# Patient Record
Sex: FEMALE | Race: OTHER | HISPANIC OR LATINO | Employment: UNEMPLOYED | URBAN - METROPOLITAN AREA
[De-identification: names, ages, dates, MRNs, and addresses within clinical notes are randomized per-mention and may not be internally consistent; named-entity substitution may affect disease eponyms.]

---

## 2018-12-02 ENCOUNTER — APPOINTMENT (OUTPATIENT)
Dept: RADIOLOGY | Facility: MEDICAL CENTER | Age: 28
End: 2018-12-02
Attending: ANESTHESIOLOGY
Payer: COMMERCIAL

## 2018-12-02 ENCOUNTER — APPOINTMENT (OUTPATIENT)
Dept: RADIOLOGY | Facility: MEDICAL CENTER | Age: 28
End: 2018-12-02
Attending: FAMILY MEDICINE
Payer: COMMERCIAL

## 2018-12-02 ENCOUNTER — HOSPITAL ENCOUNTER (INPATIENT)
Facility: MEDICAL CENTER | Age: 28
LOS: 3 days | End: 2018-12-05
Admitting: OBSTETRICS & GYNECOLOGY
Payer: COMMERCIAL

## 2018-12-02 DIAGNOSIS — Z98.891 S/P REPEAT LOW TRANSVERSE C-SECTION: ICD-10-CM

## 2018-12-02 LAB
ALBUMIN SERPL BCP-MCNC: 3 G/DL (ref 3.2–4.9)
ALBUMIN/GLOB SERPL: 1 G/DL
ALP SERPL-CCNC: 157 U/L (ref 30–99)
ALT SERPL-CCNC: 10 U/L (ref 2–50)
ANION GAP SERPL CALC-SCNC: 11 MMOL/L (ref 0–11.9)
ANION GAP SERPL CALC-SCNC: 11 MMOL/L (ref 0–11.9)
APTT PPP: 26.9 SEC (ref 24.7–36)
APTT PPP: 27.4 SEC (ref 24.7–36)
AST SERPL-CCNC: 13 U/L (ref 12–45)
BASE EXCESS BLDA CALC-SCNC: -4 MMOL/L (ref -4–3)
BASOPHILS # BLD AUTO: 0.3 % (ref 0–1.8)
BASOPHILS # BLD AUTO: 0.3 % (ref 0–1.8)
BASOPHILS # BLD: 0.03 K/UL (ref 0–0.12)
BASOPHILS # BLD: 0.04 K/UL (ref 0–0.12)
BILIRUB SERPL-MCNC: 0.6 MG/DL (ref 0.1–1.5)
BODY TEMPERATURE: 36.5 CENTIGRADE
BUN SERPL-MCNC: 3 MG/DL (ref 8–22)
BUN SERPL-MCNC: 3 MG/DL (ref 8–22)
CALCIUM SERPL-MCNC: 7.7 MG/DL (ref 8.5–10.5)
CALCIUM SERPL-MCNC: 7.8 MG/DL (ref 8.5–10.5)
CHLORIDE SERPL-SCNC: 107 MMOL/L (ref 96–112)
CHLORIDE SERPL-SCNC: 108 MMOL/L (ref 96–112)
CO2 SERPL-SCNC: 18 MMOL/L (ref 20–33)
CO2 SERPL-SCNC: 19 MMOL/L (ref 20–33)
CREAT SERPL-MCNC: 0.32 MG/DL (ref 0.5–1.4)
CREAT SERPL-MCNC: 0.41 MG/DL (ref 0.5–1.4)
CRYSTALS AMN MICRO: NORMAL
EKG IMPRESSION: NORMAL
EOSINOPHIL # BLD AUTO: 0.04 K/UL (ref 0–0.51)
EOSINOPHIL # BLD AUTO: 0.06 K/UL (ref 0–0.51)
EOSINOPHIL NFR BLD: 0.3 % (ref 0–6.9)
EOSINOPHIL NFR BLD: 0.6 % (ref 0–6.9)
ERYTHROCYTE [DISTWIDTH] IN BLOOD BY AUTOMATED COUNT: 42.7 FL (ref 35.9–50)
ERYTHROCYTE [DISTWIDTH] IN BLOOD BY AUTOMATED COUNT: 43.5 FL (ref 35.9–50)
GLOBULIN SER CALC-MCNC: 2.9 G/DL (ref 1.9–3.5)
GLUCOSE SERPL-MCNC: 100 MG/DL (ref 65–99)
GLUCOSE SERPL-MCNC: 101 MG/DL (ref 65–99)
HCO3 BLDA-SCNC: 19 MMOL/L (ref 17–25)
HCT VFR BLD AUTO: 35.9 % (ref 37–47)
HCT VFR BLD AUTO: 37.2 % (ref 37–47)
HCT VFR BLD AUTO: 38.6 % (ref 37–47)
HGB BLD-MCNC: 11.6 G/DL (ref 12–16)
HGB BLD-MCNC: 12.4 G/DL (ref 12–16)
HGB BLD-MCNC: 13.2 G/DL (ref 12–16)
HOLDING TUBE BB 8507: NORMAL
IMM GRANULOCYTES # BLD AUTO: 0.17 K/UL (ref 0–0.11)
IMM GRANULOCYTES # BLD AUTO: 0.23 K/UL (ref 0–0.11)
IMM GRANULOCYTES NFR BLD AUTO: 1.6 % (ref 0–0.9)
IMM GRANULOCYTES NFR BLD AUTO: 1.7 % (ref 0–0.9)
INR PPP: 1.02 (ref 0.87–1.13)
INR PPP: 1.05 (ref 0.87–1.13)
LYMPHOCYTES # BLD AUTO: 1.95 K/UL (ref 1–4.8)
LYMPHOCYTES # BLD AUTO: 2.05 K/UL (ref 1–4.8)
LYMPHOCYTES NFR BLD: 13.7 % (ref 22–41)
LYMPHOCYTES NFR BLD: 20.7 % (ref 22–41)
MAGNESIUM SERPL-MCNC: 1.7 MG/DL (ref 1.5–2.5)
MCH RBC QN AUTO: 30.5 PG (ref 27–33)
MCH RBC QN AUTO: 30.7 PG (ref 27–33)
MCHC RBC AUTO-ENTMCNC: 33.3 G/DL (ref 33.6–35)
MCHC RBC AUTO-ENTMCNC: 34.2 G/DL (ref 33.6–35)
MCV RBC AUTO: 89.8 FL (ref 81.4–97.8)
MCV RBC AUTO: 91.6 FL (ref 81.4–97.8)
MONOCYTES # BLD AUTO: 0.65 K/UL (ref 0–0.85)
MONOCYTES # BLD AUTO: 0.74 K/UL (ref 0–0.85)
MONOCYTES NFR BLD AUTO: 5.2 % (ref 0–13.4)
MONOCYTES NFR BLD AUTO: 6.6 % (ref 0–13.4)
NEUTROPHILS # BLD AUTO: 11.27 K/UL (ref 2–7.15)
NEUTROPHILS # BLD AUTO: 6.95 K/UL (ref 2–7.15)
NEUTROPHILS NFR BLD: 70.1 % (ref 44–72)
NEUTROPHILS NFR BLD: 78.9 % (ref 44–72)
NRBC # BLD AUTO: 0 K/UL
NRBC # BLD AUTO: 0 K/UL
NRBC BLD-RTO: 0 /100 WBC
NRBC BLD-RTO: 0 /100 WBC
PCO2 BLDA: 30.7 MMHG (ref 26–37)
PCO2 TEMP ADJ BLDA: 30 MMHG (ref 26–37)
PH BLDA: 7.41 [PH] (ref 7.4–7.5)
PH TEMP ADJ BLDA: 7.42 [PH] (ref 7.4–7.5)
PLATELET # BLD AUTO: 168 K/UL (ref 164–446)
PLATELET # BLD AUTO: 179 K/UL (ref 164–446)
PMV BLD AUTO: 11.1 FL (ref 9–12.9)
PMV BLD AUTO: 11.3 FL (ref 9–12.9)
PO2 BLDA: 94.1 MMHG (ref 64–87)
PO2 TEMP ADJ BLDA: 91.2 MMHG (ref 64–87)
POTASSIUM SERPL-SCNC: 3.4 MMOL/L (ref 3.6–5.5)
POTASSIUM SERPL-SCNC: 3.4 MMOL/L (ref 3.6–5.5)
PROT SERPL-MCNC: 5.9 G/DL (ref 6–8.2)
PROTHROMBIN TIME: 13.5 SEC (ref 12–14.6)
PROTHROMBIN TIME: 13.8 SEC (ref 12–14.6)
RBC # BLD AUTO: 4.06 M/UL (ref 4.2–5.4)
RBC # BLD AUTO: 4.3 M/UL (ref 4.2–5.4)
SAO2 % BLDA: 96.5 % (ref 93–99)
SODIUM SERPL-SCNC: 137 MMOL/L (ref 135–145)
SODIUM SERPL-SCNC: 137 MMOL/L (ref 135–145)
TROPONIN I SERPL-MCNC: <0.01 NG/ML (ref 0–0.04)
WBC # BLD AUTO: 14.3 K/UL (ref 4.8–10.8)
WBC # BLD AUTO: 9.9 K/UL (ref 4.8–10.8)

## 2018-12-02 PROCEDURE — 71045 X-RAY EXAM CHEST 1 VIEW: CPT

## 2018-12-02 PROCEDURE — 82803 BLOOD GASES ANY COMBINATION: CPT

## 2018-12-02 PROCEDURE — 700111 HCHG RX REV CODE 636 W/ 250 OVERRIDE (IP): Performed by: OBSTETRICS & GYNECOLOGY

## 2018-12-02 PROCEDURE — 305385 HCHG SURGICAL SERVICES 1/4 HOUR: Performed by: OBSTETRICS & GYNECOLOGY

## 2018-12-02 PROCEDURE — 80053 COMPREHEN METABOLIC PANEL: CPT

## 2018-12-02 PROCEDURE — 85018 HEMOGLOBIN: CPT

## 2018-12-02 PROCEDURE — 770020 HCHG ROOM/CARE - TELE (206)

## 2018-12-02 PROCEDURE — 85730 THROMBOPLASTIN TIME PARTIAL: CPT

## 2018-12-02 PROCEDURE — 700111 HCHG RX REV CODE 636 W/ 250 OVERRIDE (IP): Performed by: ANESTHESIOLOGY

## 2018-12-02 PROCEDURE — 85025 COMPLETE CBC W/AUTO DIFF WBC: CPT

## 2018-12-02 PROCEDURE — 700117 HCHG RX CONTRAST REV CODE 255: Performed by: FAMILY MEDICINE

## 2018-12-02 PROCEDURE — 80048 BASIC METABOLIC PNL TOTAL CA: CPT

## 2018-12-02 PROCEDURE — 93010 ELECTROCARDIOGRAM REPORT: CPT | Mod: 77 | Performed by: INTERNAL MEDICINE

## 2018-12-02 PROCEDURE — 700102 HCHG RX REV CODE 250 W/ 637 OVERRIDE(OP): Performed by: ANESTHESIOLOGY

## 2018-12-02 PROCEDURE — 89060 EXAM SYNOVIAL FLUID CRYSTALS: CPT

## 2018-12-02 PROCEDURE — 93005 ELECTROCARDIOGRAM TRACING: CPT | Performed by: ANESTHESIOLOGY

## 2018-12-02 PROCEDURE — 93005 ELECTROCARDIOGRAM TRACING: CPT | Performed by: STUDENT IN AN ORGANIZED HEALTH CARE EDUCATION/TRAINING PROGRAM

## 2018-12-02 PROCEDURE — 59514 CESAREAN DELIVERY ONLY: CPT | Performed by: OBSTETRICS & GYNECOLOGY

## 2018-12-02 PROCEDURE — 700102 HCHG RX REV CODE 250 W/ 637 OVERRIDE(OP): Performed by: STUDENT IN AN ORGANIZED HEALTH CARE EDUCATION/TRAINING PROGRAM

## 2018-12-02 PROCEDURE — 71275 CT ANGIOGRAPHY CHEST: CPT

## 2018-12-02 PROCEDURE — 36415 COLL VENOUS BLD VENIPUNCTURE: CPT

## 2018-12-02 PROCEDURE — 304964 HCHG RECOVERY ROOM TIME 1HR: Performed by: OBSTETRICS & GYNECOLOGY

## 2018-12-02 PROCEDURE — 700105 HCHG RX REV CODE 258: Performed by: ANESTHESIOLOGY

## 2018-12-02 PROCEDURE — A9270 NON-COVERED ITEM OR SERVICE: HCPCS | Performed by: OBSTETRICS & GYNECOLOGY

## 2018-12-02 PROCEDURE — 3E0P05Z INTRODUCTION OF ADHESION BARRIER INTO FEMALE REPRODUCTIVE, OPEN APPROACH: ICD-10-PCS | Performed by: OBSTETRICS & GYNECOLOGY

## 2018-12-02 PROCEDURE — 59514 CESAREAN DELIVERY ONLY: CPT | Mod: 80

## 2018-12-02 PROCEDURE — 84484 ASSAY OF TROPONIN QUANT: CPT | Mod: 91

## 2018-12-02 PROCEDURE — 0HB7XZZ EXCISION OF ABDOMEN SKIN, EXTERNAL APPROACH: ICD-10-PCS | Performed by: OBSTETRICS & GYNECOLOGY

## 2018-12-02 PROCEDURE — 93010 ELECTROCARDIOGRAM REPORT: CPT | Performed by: INTERNAL MEDICINE

## 2018-12-02 PROCEDURE — A9270 NON-COVERED ITEM OR SERVICE: HCPCS | Performed by: STUDENT IN AN ORGANIZED HEALTH CARE EDUCATION/TRAINING PROGRAM

## 2018-12-02 PROCEDURE — 83735 ASSAY OF MAGNESIUM: CPT

## 2018-12-02 PROCEDURE — 306828 HCHG ANES-TIME GENERAL: Performed by: OBSTETRICS & GYNECOLOGY

## 2018-12-02 PROCEDURE — 700102 HCHG RX REV CODE 250 W/ 637 OVERRIDE(OP): Performed by: OBSTETRICS & GYNECOLOGY

## 2018-12-02 PROCEDURE — 59514 CESAREAN DELIVERY ONLY: CPT

## 2018-12-02 PROCEDURE — 700101 HCHG RX REV CODE 250

## 2018-12-02 PROCEDURE — 700111 HCHG RX REV CODE 636 W/ 250 OVERRIDE (IP)

## 2018-12-02 PROCEDURE — 85014 HEMATOCRIT: CPT

## 2018-12-02 PROCEDURE — 85610 PROTHROMBIN TIME: CPT

## 2018-12-02 PROCEDURE — 304966 HCHG RECOVERY SVSC TIME ADDL 1/2 HR: Performed by: OBSTETRICS & GYNECOLOGY

## 2018-12-02 RX ORDER — HALOPERIDOL 5 MG/ML
1 INJECTION INTRAMUSCULAR
Status: DISCONTINUED | OUTPATIENT
Start: 2018-12-02 | End: 2018-12-02

## 2018-12-02 RX ORDER — SODIUM CHLORIDE, SODIUM LACTATE, POTASSIUM CHLORIDE, CALCIUM CHLORIDE 600; 310; 30; 20 MG/100ML; MG/100ML; MG/100ML; MG/100ML
INJECTION, SOLUTION INTRAVENOUS CONTINUOUS
Status: DISCONTINUED | OUTPATIENT
Start: 2018-12-02 | End: 2018-12-03

## 2018-12-02 RX ORDER — OXYCODONE HYDROCHLORIDE 5 MG/1
5 TABLET ORAL EVERY 4 HOURS PRN
Status: ACTIVE | OUTPATIENT
Start: 2018-12-02 | End: 2018-12-03

## 2018-12-02 RX ORDER — CITRIC ACID/SODIUM CITRATE 334-500MG
30 SOLUTION, ORAL ORAL ONCE
Status: COMPLETED | OUTPATIENT
Start: 2018-12-02 | End: 2018-12-02

## 2018-12-02 RX ORDER — DIPHENHYDRAMINE HYDROCHLORIDE 50 MG/ML
25 INJECTION INTRAMUSCULAR; INTRAVENOUS EVERY 6 HOURS PRN
Status: ACTIVE | OUTPATIENT
Start: 2018-12-02 | End: 2018-12-03

## 2018-12-02 RX ORDER — ACETAMINOPHEN 500 MG
1000 TABLET ORAL EVERY 6 HOURS
Status: DISCONTINUED | OUTPATIENT
Start: 2018-12-02 | End: 2018-12-03

## 2018-12-02 RX ORDER — SIMETHICONE 80 MG
80 TABLET,CHEWABLE ORAL 4 TIMES DAILY PRN
Status: DISCONTINUED | OUTPATIENT
Start: 2018-12-02 | End: 2018-12-05 | Stop reason: HOSPADM

## 2018-12-02 RX ORDER — OXYCODONE HCL 5 MG/5 ML
5 SOLUTION, ORAL ORAL
Status: DISCONTINUED | OUTPATIENT
Start: 2018-12-02 | End: 2018-12-02

## 2018-12-02 RX ORDER — OXYCODONE HYDROCHLORIDE AND ACETAMINOPHEN 5; 325 MG/1; MG/1
2 TABLET ORAL EVERY 4 HOURS PRN
Status: DISCONTINUED | OUTPATIENT
Start: 2018-12-02 | End: 2018-12-05 | Stop reason: HOSPADM

## 2018-12-02 RX ORDER — DIPHENHYDRAMINE HYDROCHLORIDE 50 MG/ML
12.5 INJECTION INTRAMUSCULAR; INTRAVENOUS EVERY 6 HOURS PRN
Status: ACTIVE | OUTPATIENT
Start: 2018-12-02 | End: 2018-12-03

## 2018-12-02 RX ORDER — OXYCODONE HYDROCHLORIDE 10 MG/1
10 TABLET ORAL
Status: DISCONTINUED | OUTPATIENT
Start: 2018-12-02 | End: 2018-12-02

## 2018-12-02 RX ORDER — ASPIRIN 300 MG/1
300 SUPPOSITORY RECTAL DAILY
Status: DISCONTINUED | OUTPATIENT
Start: 2018-12-02 | End: 2018-12-05 | Stop reason: HOSPADM

## 2018-12-02 RX ORDER — HYDROMORPHONE HYDROCHLORIDE 1 MG/ML
0.2 INJECTION, SOLUTION INTRAMUSCULAR; INTRAVENOUS; SUBCUTANEOUS
Status: ACTIVE | OUTPATIENT
Start: 2018-12-02 | End: 2018-12-03

## 2018-12-02 RX ORDER — KETOROLAC TROMETHAMINE 30 MG/ML
30 INJECTION, SOLUTION INTRAMUSCULAR; INTRAVENOUS EVERY 6 HOURS
Status: COMPLETED | OUTPATIENT
Start: 2018-12-02 | End: 2018-12-03

## 2018-12-02 RX ORDER — ASPIRIN 81 MG/1
324 TABLET, CHEWABLE ORAL DAILY
Status: DISCONTINUED | OUTPATIENT
Start: 2018-12-02 | End: 2018-12-05 | Stop reason: HOSPADM

## 2018-12-02 RX ORDER — ONDANSETRON 2 MG/ML
INJECTION INTRAMUSCULAR; INTRAVENOUS
Status: ACTIVE
Start: 2018-12-02 | End: 2018-12-03

## 2018-12-02 RX ORDER — ONDANSETRON 2 MG/ML
4 INJECTION INTRAMUSCULAR; INTRAVENOUS
Status: DISCONTINUED | OUTPATIENT
Start: 2018-12-02 | End: 2018-12-02

## 2018-12-02 RX ORDER — SODIUM CHLORIDE, SODIUM GLUCONATE, SODIUM ACETATE, POTASSIUM CHLORIDE AND MAGNESIUM CHLORIDE 526; 502; 368; 37; 30 MG/100ML; MG/100ML; MG/100ML; MG/100ML; MG/100ML
1500 INJECTION, SOLUTION INTRAVENOUS ONCE
Status: COMPLETED | OUTPATIENT
Start: 2018-12-02 | End: 2018-12-02

## 2018-12-02 RX ORDER — SODIUM CHLORIDE, SODIUM LACTATE, POTASSIUM CHLORIDE, CALCIUM CHLORIDE 600; 310; 30; 20 MG/100ML; MG/100ML; MG/100ML; MG/100ML
INJECTION, SOLUTION INTRAVENOUS CONTINUOUS
Status: DISCONTINUED | OUTPATIENT
Start: 2018-12-02 | End: 2018-12-02

## 2018-12-02 RX ORDER — MEPERIDINE HYDROCHLORIDE 50 MG/ML
12.5 INJECTION INTRAMUSCULAR; INTRAVENOUS; SUBCUTANEOUS
Status: DISCONTINUED | OUTPATIENT
Start: 2018-12-02 | End: 2018-12-02

## 2018-12-02 RX ORDER — OXYCODONE HCL 5 MG/5 ML
10 SOLUTION, ORAL ORAL
Status: DISCONTINUED | OUTPATIENT
Start: 2018-12-02 | End: 2018-12-02

## 2018-12-02 RX ORDER — OXYCODONE HYDROCHLORIDE AND ACETAMINOPHEN 5; 325 MG/1; MG/1
1 TABLET ORAL EVERY 4 HOURS PRN
Status: DISCONTINUED | OUTPATIENT
Start: 2018-12-02 | End: 2018-12-05 | Stop reason: HOSPADM

## 2018-12-02 RX ORDER — KETOROLAC TROMETHAMINE 30 MG/ML
30 INJECTION, SOLUTION INTRAMUSCULAR; INTRAVENOUS EVERY 6 HOURS
Status: DISCONTINUED | OUTPATIENT
Start: 2018-12-02 | End: 2018-12-02

## 2018-12-02 RX ORDER — DOCUSATE SODIUM 100 MG/1
100 CAPSULE, LIQUID FILLED ORAL 2 TIMES DAILY PRN
Status: DISCONTINUED | OUTPATIENT
Start: 2018-12-02 | End: 2018-12-05 | Stop reason: HOSPADM

## 2018-12-02 RX ORDER — MISOPROSTOL 200 UG/1
800 TABLET ORAL
Status: DISCONTINUED | OUTPATIENT
Start: 2018-12-02 | End: 2018-12-05 | Stop reason: HOSPADM

## 2018-12-02 RX ORDER — OXYCODONE HYDROCHLORIDE 5 MG/1
5 TABLET ORAL
Status: DISCONTINUED | OUTPATIENT
Start: 2018-12-02 | End: 2018-12-02

## 2018-12-02 RX ORDER — IBUPROFEN 600 MG/1
600 TABLET ORAL EVERY 6 HOURS PRN
Status: DISCONTINUED | OUTPATIENT
Start: 2018-12-03 | End: 2018-12-05 | Stop reason: HOSPADM

## 2018-12-02 RX ORDER — ASPIRIN 325 MG
325 TABLET ORAL DAILY
Status: DISCONTINUED | OUTPATIENT
Start: 2018-12-02 | End: 2018-12-05 | Stop reason: HOSPADM

## 2018-12-02 RX ORDER — MIDAZOLAM HYDROCHLORIDE 1 MG/ML
1 INJECTION INTRAMUSCULAR; INTRAVENOUS
Status: DISCONTINUED | OUTPATIENT
Start: 2018-12-02 | End: 2018-12-02

## 2018-12-02 RX ORDER — ONDANSETRON 2 MG/ML
4 INJECTION INTRAMUSCULAR; INTRAVENOUS EVERY 6 HOURS PRN
Status: ACTIVE | OUTPATIENT
Start: 2018-12-02 | End: 2018-12-03

## 2018-12-02 RX ORDER — METOCLOPRAMIDE HYDROCHLORIDE 5 MG/ML
10 INJECTION INTRAMUSCULAR; INTRAVENOUS ONCE
Status: COMPLETED | OUTPATIENT
Start: 2018-12-02 | End: 2018-12-02

## 2018-12-02 RX ORDER — SODIUM CHLORIDE, SODIUM LACTATE, POTASSIUM CHLORIDE, CALCIUM CHLORIDE 600; 310; 30; 20 MG/100ML; MG/100ML; MG/100ML; MG/100ML
INJECTION, SOLUTION INTRAVENOUS PRN
Status: DISCONTINUED | OUTPATIENT
Start: 2018-12-02 | End: 2018-12-05 | Stop reason: HOSPADM

## 2018-12-02 RX ORDER — MORPHINE SULFATE 10 MG/ML
4 INJECTION, SOLUTION INTRAMUSCULAR; INTRAVENOUS
Status: DISCONTINUED | OUTPATIENT
Start: 2018-12-02 | End: 2018-12-05 | Stop reason: HOSPADM

## 2018-12-02 RX ADMIN — SODIUM CITRATE AND CITRIC ACID MONOHYDRATE 30 ML: 500; 334 SOLUTION ORAL at 10:54

## 2018-12-02 RX ADMIN — IOHEXOL 80 ML: 350 INJECTION, SOLUTION INTRAVENOUS at 17:52

## 2018-12-02 RX ADMIN — FAMOTIDINE 20 MG: 10 INJECTION INTRAVENOUS at 10:53

## 2018-12-02 RX ADMIN — ASPIRIN 325 MG: 325 TABLET, COATED ORAL at 15:56

## 2018-12-02 RX ADMIN — ONDANSETRON 4 MG: 2 INJECTION INTRAMUSCULAR; INTRAVENOUS at 16:05

## 2018-12-02 RX ADMIN — OXYCODONE AND ACETAMINOPHEN 2 TABLET: 5; 325 TABLET ORAL at 15:51

## 2018-12-02 RX ADMIN — METOCLOPRAMIDE 10 MG: 5 INJECTION, SOLUTION INTRAMUSCULAR; INTRAVENOUS at 10:53

## 2018-12-02 RX ADMIN — KETOROLAC TROMETHAMINE 30 MG: 30 INJECTION, SOLUTION INTRAMUSCULAR at 18:10

## 2018-12-02 RX ADMIN — Medication 125 ML/HR: at 15:58

## 2018-12-02 RX ADMIN — ALBUTEROL SULFATE 2.5 MG: 2.5 SOLUTION RESPIRATORY (INHALATION) at 13:35

## 2018-12-02 RX ADMIN — SODIUM CHLORIDE, SODIUM GLUCONATE, SODIUM ACETATE, POTASSIUM CHLORIDE AND MAGNESIUM CHLORIDE 1000 ML: 526; 502; 368; 37; 30 INJECTION, SOLUTION INTRAVENOUS at 10:54

## 2018-12-02 ASSESSMENT — PAIN SCALES - GENERAL
PAINLEVEL_OUTOF10: 3
PAINLEVEL_OUTOF10: 7
PAINLEVEL_OUTOF10: 0
PAINLEVEL_OUTOF10: 8
PAINLEVEL_OUTOF10: 0
PAINLEVEL_OUTOF10: 0
PAINLEVEL_OUTOF10: 10
PAINLEVEL_OUTOF10: 0

## 2018-12-02 ASSESSMENT — PATIENT HEALTH QUESTIONNAIRE - PHQ9
2. FEELING DOWN, DEPRESSED, IRRITABLE, OR HOPELESS: NOT AT ALL
SUM OF ALL RESPONSES TO PHQ9 QUESTIONS 1 AND 2: 0
1. LITTLE INTEREST OR PLEASURE IN DOING THINGS: NOT AT ALL

## 2018-12-02 NOTE — OP REPORT
DATE OF PROCEDURE:  2018    POST-OP DIAGNOSIS: 37 weeks, SROM, previous  section x 1    PROCEDURE: REPEAT LOW TRANSVERSE  SECTION (PFANNENSTIEL)                            SCAR REVISION                            VACUUM-ASSISTED DELIVERY OF FETAL HEAD    SURGEON: KOKI COX    ASSIST:  SARTHAK COX PGY-1    ANESTHESIOLOGIST: GANSERT MD  TYPE OF ANESTHESIA: SPINAL    SPECIMEN: CORD GAS    EBL: 600 CC      UO: CLEAR    FINDINGS:   Delivered to LB MALE; VERTEX presentation; BW: pending   AS: .   Clear/Meconium-stained amniotic fluid   Placenta delivered manually complete and intact, 3VC   Uterus, bilateral ovaries and tubes grossly normal    DESCRIPTION OF PROCEDURE:      Patient and family discussed about the risks, indications, alternatives and complications of the procedure. No further questions. Signed consents.   Patient is taken to the operating room where spinal anesthesia induction was done without difficulty. She is then placed in a supine position with a leftward tilt, prepped and draped in a normal usual sterile fashion. After adequate level of anesthesia has been ascertained, Previous scar was removed sharply, Pfannenstiel incision is then made on the skin and carried down to the underlying layer of fascia. Fascia is nicked in the midline and incision carried down bilaterally sharply. Superior and inferior aspects of fascial incision is clamped with Kocher's, elevated, tented up and dissected off bluntly from underlying rectus muscle. Rectus muscle is then  in the midline and peritoneum entered sharply and extended cephalo-caudally. Good visualization of the lower uterine segment afforded. Bladder blade is inserted with identification of the vesico-uterine peritoneum and creation of bladder flap to put down the bladder.   Low transverse incision is then made at the lower uterine segment and extended bilaterally digitally. Amniotomy is clear. Infant is delivered in vertex  presentation, vacuum-assisted, good suctioning of the nose and mouth is done and rest of the body delivered atraumatically. Cord clamped and cut and handed off to the RN in attendance for further care.   Placenta delivered complete and intact, 3 VC.  Uterus is exteriorized. The cavity is cleansed of all clots and debris.   Hysterotomy incision is repaired using Chromic 1 in a continuous interlocking fashion with excellent hemostasis.   Uterus is returned into the abdomen. Pelvic gutters cleared of clots and debris.   Seprafilm placed over anterior uterine wall and hysterotomy repair  Peritoneal layer closed in simple continuous manner using Vicryl 2-0.  Rectus muscle reapproximated in the midline using chromic 1.  Fascial layer repaired using Vicryl 0 in the simple continuous fashion.  Subcutaneous layer reapproximated using Vicryl 3-0.  Skin closed with Insorb staples.   All layers are hemostatic.  Procedure is terminated. Sponges, Laps and needles count correct x 3.  Patient is taken to the PACU, awake and in good condition.

## 2018-12-02 NOTE — PROGRESS NOTES
Transferred pt from L&D w/ Jimena LAMA. Report received at the bedside. Chest pain resolved. Gomes in place. Oxytocin running at 125ml/hr, rate verified w/  L&D RN.  at bedside. No complains of SOB. Tele box on, monitor room notified. EKG showing sinus rhythm. Fundus check performed w/ L&D RN  - 1 below, normal amount of vaginal bleeing per  L& D RN. Dressing CDI. Call light and belongings within reach. Bed alarm in place. Bed in lowest position.

## 2018-12-02 NOTE — PROGRESS NOTES
27 yo female s/p repeat  section at 37 weeks pregnant.    Received a spinal anesthetic without complications. Stable during perioperative period. Transferred to PACU without complaints    During PACU stay pt had an acute episode of SOB and non-radiating chest pressure. Sinus tachycardia with HR 130s, all other vitals normal. Given O2 and rapid response called.    Physical exam:   A&Ox4  Sinus tachycardia  Lungs clear    Normal - EKG, chest x-ray, CBC, BMP, ptt/inr, and troponin    SOB and chest pressure resolved with albuterol nebulizer however patient continues to have brief episodes of dizziness and looks pale.    Assessment:  Most likely reactive airway disease vs panic attack. However cannot completely rule out amniotic fluid embolism at this time     Plan:  Transfer to telemetry for continuous monitoring of EKG and O2 sats, daily CBC and COAGS. Patient is currently stable and without complaints at this time

## 2018-12-02 NOTE — CODE DOCUMENTATION
Telemetry RN Marcelo and tele charge at bedside. Primary L&D RN Ora going with team to transport patient to telemetry bed.

## 2018-12-02 NOTE — CODE DOCUMENTATION
Patient here for repeat  and had been tachycardic in low 100s prior to repeat. Pt in L&D OR and had acute onset chest pain. Rapid team arrived.  monitor at bedside. Stat labs, EKG, and chest x-ray ordered. Pitocin currently running on patient.

## 2018-12-02 NOTE — PROGRESS NOTES
Patient comes in with complaints of leaking fluid and painful contractions.  She is 37.2  with hx of prior c/s xs1 in [place].  Sterile spec done and gross pooling noted.  Slide sent for ashley, positive.  [facility] resident DR Esteban notifed and she will contact the attending.

## 2018-12-02 NOTE — H&P
History and Physical      Alejandra Reyes Villegas is a 28 y.o. female  at 37w2d who presents for contractions and leakage of fluid per vagina    Subjective:   positive  For CTXS.   positive Feels pain   positive for LOF  negative for vaginal bleeding.   positive for fetal movement    ROS: A comprehensive review of systems was negative.    No past medical history on file.  No past surgical history on file.  OB History    Para Term  AB Living   2 1 1     1   SAB TAB Ectopic Molar Multiple Live Births             1      # Outcome Date GA Lbr Chris/2nd Weight Sex Delivery Anes PTL Lv   2 Current            1 Term      CS-Unspec   MEGGAN        Social History     Social History   • Marital status:      Spouse name: N/A   • Number of children: N/A   • Years of education: N/A     Occupational History   • Not on file.     Social History Main Topics   • Smoking status: Not on file   • Smokeless tobacco: Not on file   • Alcohol use Not on file   • Drug use: Unknown   • Sexual activity: Not on file     Other Topics Concern   • Not on file     Social History Narrative   • No narrative on file     Allergies: Patient has no known allergies.    Current Facility-Administered Medications:   •  lactated ringers (LR) infusion, , Intravenous, Continuous, Jenna Toscano M.D.    Prenatal care with [facility]  with following problems:  There are no active problems to display for this patient.    Objective:      Blood pressure 113/76, pulse (!) 108, temperature 36.8 °C (98.3 °F), temperature source Temporal, height 1.524 m (5'), weight 70.3 kg (155 lb).    General:   no acute distress, alert, cooperative   Skin:   normal   HEENT:  Sclera clear, anicteric   Lungs:   CTA bilateral   Heart:   S1, S2 normal, no murmur, click, rub or gallop, regular rate and rhythm   Abdomen:   gravid, NT   EFW:  3000       FHT:  140 BPM category I; toco q 2-3    Uterine Size: S=D   Presentations: Cephalic   Cervix:      Dilation: 3cm    Effacement: 100%    Station:  -2    Consistency: Soft    Position: Anterior     Lab Review  Lab:   Blood type:     Recent Results (from the past 5880 hour(s))   Ferning if suspected rupture of membranes (ROM)    Collection Time: 18  9:25 AM   Result Value Ref Range    Fern Test On Amniotic Fluid see below Not present        Assessment:   Alejandra Reyes Villegas at 37w2d  Labor status: in beginning labor SROM, previous  section x 1  Obstetrical history significant for There are no active problems to display for this patient.  .      Plan:     Admit to L&D  For Repeat  section   Discussed with the patient indications for  delivery. The patient voiced understanding of indications for  section at this time.    Discussed with the patient the risks of  delivery. The risks include infection, bleeding, scarring, damage to other organs in the area of operation. Specifically organs that can be damaged are bowel, bladder, ureters. I also discussed with the patient the risk of wound infection and wound breakdown. We discussed that these risks are greater in people that have diabetes or obesity. I also discussed the risk of emergency blood transfusion during procedure as well as emergency hysterectomy during procedure.    Patient had the opportunity to ask questions regarding procedures. All questions answered to the patient's satisfaction.  Proceed with  delivery    GIO, who is her , translated for the patient in obtaining the consents

## 2018-12-02 NOTE — CODE DOCUMENTATION
Bed control called. Transfer orders to telemetry per Dr. Kauffman of [facility]

## 2018-12-02 NOTE — PROGRESS NOTES
1000: Report received from IGLESIA Burger RN. Plan of care discussed. IGLESIA Burger unable to get a hold of [facility]. Will continue to call.   1025: Reza West at bedside to see patient and consent her for C/S at this time. Patient states understanding. Admission procedures for C/S begun at this time. FOB at bedside.  1100: Prenatal labs unavailable at this time. RN requests  to call [facility], as well as [facility] for prenatal labs.   1130: Patient ready for C/S at this time. RN unable to get prenatal labs as [facility] is in delivery.  1200: [facility] out of delivery. RN obtains prenatal labs.  1206: Patient walks to OR 2 at this time.  1300: Patient transferred to PACU at this time. VSS.   1315: Patient grabbing chest and neck and says she is unable to breathe at this time. Patient gasping for air while grabbing neck. Patient face goes very pale at this time. Dr. Gansert called to bedside to assess. 10L via facemask placed on patient at this time.   1321: Rapid called at this time.  1322: Leydi West called a this time.  1325: Rapid team at bedside.  1430: Patient transferred to T721 at this time. Tele nurses at bedside for transfer.  1445: Report given to Tele RN. Plan of care discussed.

## 2018-12-02 NOTE — CONSULTS
[facility] Consultation HISTORY AND PHYSICAL     PATIENT ID:  NAME:  Alejandra Reyes Villegas  MRN:               [id number]  YOB: 1990    Date of Admission: 2018     Attending: Dr. Ernestina Ibarra MD    Resident: Dr. Michelle Ospina, PGY-1    Primary Care Physician:  None    CC:  Chest pain    HPI:   Alejandra Reyes Villegas is a 28 y.o. female with pmhx of borderline low blood pressure who presented with acute chest pain following repeat  at 37w2d. Following the , patient was taken to the PACU where she had sudden onset of non-radiating chest pressure and tachycardia. Her HR was noted to be up in the 130s, here other vital signs were within normal. She was given O2 supplementation a rapid response was called. STAT troponin and EKG were obtained. Initial troponin was negative. EKG revealed tachycardia and borderline QTc interval otherwise wnl. CXR, CBC, BMP and coags were also drawn- theses were all wnl. She was given an albuterol nebulizer treatment  which did seam to resolve her chest pressure and SOB. We were consulted for r/o ACS/PE. Per patient she has never had any symptoms like this previously. She denies a history of heart attacks of blood clots. She had regular PNC via Dr. Rosen- she denies there being any complications with her pregnancy.     Patient reports very minimal chest pressure on telemetry without shortness of breath or tachycardia. She is complaining more about her lower abdominal pain were the her csection incision is. She did have one episode of retching without emesis here- with this retching she felt some pain in her left shoulder- this was not the same pain as her chest pressure.     No family history of sudden cardiac death, PE or heart attack. Patient denies heart history herself.     Denies recent illness/fever/chills/ cough sore throat/changes in vision/dizziness/HA/abnormal back pain. Does not have PCP as she arrived from [place] of this [place] and then became pregnant has yet to establish.    REVIEW OF SYSTEMS:   Ten systems reviewed and were negative except as noted in the HPI.                PAST MEDICAL HISTORY:  Past Medical History:   Diagnosis Date   • Psychiatric problem     depression       PAST SURGICAL HISTORY:  Past Surgical History:   Procedure Laterality Date   • GYN SURGERY      C/S       FAMILY HISTORY:  History reviewed. No pertinent family history.    SOCIAL HISTORY:   Pt lives in home with  and child  Smoking several years ago prior to any of her pregnancies  Etoh use- denies  Drug use - denies    DIET:   Orders Placed This Encounter   Procedures   • Diet Order Clear Liquid     Standing Status:   Standing     Number of Occurrences:   1     Order Specific Question:   Diet:     Answer:   Clear Liquid [10]     Order Specific Question:   Miscellaneous modifications:     Answer:   New Mom [16]       ALLERGIES:  No Known Allergies    OUTPATIENT MEDICATIONS:    Current Facility-Administered Medications:   •  ONDANSETRON HCL 4 MG/2ML INJ SOLN, , , ,   •  lactated ringers (LR) infusion, , Intravenous, Continuous, Jenna Toscano M.D.  •  LR infusion, , Intravenous, Continuous, Jenna Toscano M.D.  •  oxytocin (PITOCIN) infusion (for postpartum), 2,000 mL/hr, Intravenous, Once **FOLLOWED BY** oxytocin (PITOCIN) infusion (for postpartum),  mL/hr, Intravenous, Continuous, Jenna Toscano M.D.  •  LR infusion, , Intravenous, PRN, Jenna Toscano M.D.  •  PRN oxytocin (PITOCIN) (20 Units/1000 mL) PRN for excessive uterine bleeding - See Admin Instr, 125-999 mL/hr, Intravenous, Once PRN, Jenna Toscano M.D.  •  miSOPROStol (CYTOTEC) tablet 800 mcg, 800 mcg, Rectal, Once PRN, Jenna Toscano M.D.  •  docusate sodium (COLACE) capsule 100 mg, 100 mg, Oral, BID PRN, Jenna Toscano M.D.  •  simethicone (MYLICON)  chewable tab 80 mg, 80 mg, Oral, 4X/DAY PRN, Jenna Toscano M.D.  •  ibuprofen (MOTRIN) tablet 600 mg, 600 mg, Oral, Q6HRS PRN, Jenna Toscano M.D.  •  ketorolac (TORADOL) injection 30 mg, 30 mg, Intravenous, Q6HRS **OR** ketorolac (TORADOL) injection 30 mg, 30 mg, Intramuscular, Q6HRS, Jenna Toscano M.D.  •  oxyCODONE-acetaminophen (PERCOCET) 5-325 MG per tablet 1 Tab, 1 Tab, Oral, Q4HRS PRN, Jenna Toscano M.D.  •  oxyCODONE-acetaminophen (PERCOCET) 5-325 MG per tablet 2 Tab, 2 Tab, Oral, Q4HRS PRN, Jenna Toscano M.D.  •  morphine (pf) 10 mg/mL injection 4 mg, 4 mg, Intramuscular, Q3HRS PRN, Jenna Toscano M.D.  •  oxytocin (PITOCIN) 20 UNITS/1000ML LR, , , ,   •  ondansetron (ZOFRAN) syringe/vial injection 4 mg, 4 mg, Intravenous, Once PRN, Tobey Gansert, M.D.  •  haloperidol lactate (HALDOL) injection 1 mg, 1 mg, Intravenous, Q15 MIN PRN, Tobey Gansert, M.D.  •  lactated ringers infusion, , Intravenous, Continuous, Tobey Gansert, M.D.  •  fentaNYL (SUBLIMAZE) injection 25 mcg, 25 mcg, Intravenous, Q2 MIN PRN **OR** fentaNYL (SUBLIMAZE) injection 50 mcg, 50 mcg, Intravenous, Q2 MIN PRN, Tobey Gansert, M.D.  •  oxyCODONE immediate-release (ROXICODONE) tablet 5 mg, 5 mg, Oral, Once PRN **OR** oxyCODONE immediate release (ROXICODONE) tablet 10 mg, 10 mg, Oral, Once PRN, Tobey Gansert, M.D.  •  oxyCODONE (ROXICODONE) oral solution 5 mg, 5 mg, Oral, Once PRN **OR** oxyCODONE (ROXICODONE) oral solution 10 mg, 10 mg, Oral, Once PRN, Tobey Gansert, M.D.  •  meperidine (DEMEROL) injection 12.5 mg, 12.5 mg, Intravenous, Q5 MIN PRN, Tobey Gansert, M.D.  •  acetaminophen (TYLENOL) tablet 1,000 mg, 1,000 mg, Oral, Q6HR, Tobey Gansert, M.D.  •  ketorolac (TORADOL) injection 30 mg, 30 mg, Intravenous, Q6HRS, Tobey Gansert, M.D.  •  oxyCODONE immediate-release (ROXICODONE) tablet 5 mg, 5 mg, Oral, Q4HRS PRN, Tobey Gansert, M.D.  •   HYDROmorphone pf (DILAUDID) injection 0.2 mg, 0.2 mg, Intravenous, Q2HRS PRN, Tobey Gansert, M.D.  •  ePHEDrine injection 10 mg, 10 mg, Intravenous, Q5 MIN PRN, Tobey Gansert, M.D.  •  ondansetron (ZOFRAN) syringe/vial injection 4 mg, 4 mg, Intravenous, Q6HRS PRN, Tobey Gansert, M.D.  •  diphenhydrAMINE (BENADRYL) injection 12.5 mg, 12.5 mg, Intravenous, Q6HRS PRN **OR** diphenhydrAMINE (BENADRYL) injection 25 mg, 25 mg, Intravenous, Q6HRS PRN **OR** naloxone (NARCAN) 0.4 mg in NS 1,000 mL infusion, 0.4 mg, Intravenous, PRN, Tobey Gansert, M.D.  •  midazolam (VERSED) 2 MG/2ML injection 1 mg, 1 mg, Intravenous, Q5 MIN PRN, Tobey Gansert, M.D.  •  albuterol (PROVENTIL) 2.5mg/0.5ml nebulizer solution 2.5 mg, 2.5 mg, Inhalation, Q10 MIN PRN, Tobey Gansert, M.D.  •  aspirin (ASA) tablet 325 mg, 325 mg, Oral, DAILY **OR** aspirin (ASA) chewable tab 324 mg, 324 mg, Oral, DAILY **OR** aspirin (ASA) suppository 300 mg, 300 mg, Rectal, DAILY, Michelle Ospina M.D.    PHYSICAL EXAM:  Vitals:    18 1329 18 1333 18 1342 18 1354   BP: 130/60  127/61 117/63   Pulse: 100  (!) 103 83   Resp: 18  (!) 22 18   Temp:  36.8 °C (98.3 °F)     TempSrc:       SpO2: 100%  99% 100%   Weight:       Height:       , Temp (24hrs), Av.8 °C (98.3 °F), Min:36.8 °C (98.3 °F), Max:36.8 °C (98.3 °F)  , Pulse Oximetry: 100 % (4L oxymask)    General: Pt resting in NAD, cooperative   Skin:  Pink, warm and dry.  No rashes  HEENT: NC/AT. PERRL. EOMI. MMM. No nasal discharge. Oropharynx nonerythematous without exudate/plaques  Neck:  Supple without lymphadenopathy or rigidity. No JVD   Lungs:  Symmetrical.  CTAB with no W/R/R.  Decreased air movement throughout  Cardiovascular:  S1/S2 RRR without R/G, + 2/6 MONI heard throughout  Abdomen: post partum abdomen appropriately distended w/ firm fundus palpated just below the umbilicus, dressing to the lower abdomen covering csection incision- she is appropriately tender.  "+BS  Genitourinary:  Normal external female genitalia, scant vaginal bleeding  Extremities:  Full range of motion. No gross deformities noted. 2+ pulses in all extremities. No C/C/E   Spine:  Straight without vertebral anomalies.  CNS:  Muscle tone is normal. Cranial nerves II-XII grossly intact. 2+ DTRs.       LAB TESTS:   Recent Labs      12/02/18   1030  12/02/18   1339   WBC  9.9  14.3*   RBC  4.30  4.06*   HEMOGLOBIN  13.2  12.4   HEMATOCRIT  38.6  37.2   MCV  89.8  91.6   MCH  30.7  30.5   RDW  42.7  43.5   PLATELETCT  179  168   MPV  11.3  11.1   NEUTSPOLYS  70.10  78.90*   LYMPHOCYTES  20.70*  13.70*   MONOCYTES  6.60  5.20   EOSINOPHILS  0.60  0.30   BASOPHILS  0.30  0.30     Recent Labs      12/02/18   1339   TROPONINI  <0.01     Recent Labs      12/02/18   1358   SODIUM  137   POTASSIUM  3.4*   CHLORIDE  107   CO2  19*   BUN  3*   CREATININE  0.41*   CALCIUM  7.7*   ALBUMIN  3.0*       CULTURES:   Results     ** No results found for the last 168 hours. **        EKG 12/2 1335: Sinus tach with borderline QTc interval prolongation    EKG 12/2  1503: Sinus rhythm, tachycardia no longer present nor is QTc borderline prolongation    IMAGES:  CXR: \"No acute cardiopulmonary process is seen.\"    CTA: \"No central or large segmental pulmonary embolus is identified. No acute cardiopulmonary process is seen.  Free intraperitoneal air is likely related to recent surgery.\"    CONSULTS:   [facility]    ASSESSMENT/PLAN: 28 y.o. female admitted for delivery of a term infant via csx. Consulted for acute chest pain following csxn. Transferred to telemetry for ACS/PE r/o.    #Chest pain acute onset- improved  # Shortness of breath- resolved  #Tachycardia- resolved  - No heart history or history of blood clots  - Onset s/p csx in PACU, described as chest pressure with non radiating  - Alleviated with one time albuterol nebulizer treatment, now described as minimal  - Associated with shortness of breath and " tachycardia. Otherwise normotensive  - No HA/double vision/altered mental status/speech changes/dizziness  - Initial EKG with sinus tach and borderline prolonged QTc interval, repeat NSR and borderline prolongation not present  - Troponin negative x2  - Mg 1.7, CBC, BMP coags wnl  - CXR: negative  - CTA for PE: negative  - Op note: repeat low transverse csx and scare revision, vacuum -assisted delivery of fetal head. Placenta delivered manually.  - Suspect this was likely anxiety or radiating post operative pain, in setting of pregnancy and csx could not rule out amniotic fluid embolism/PE/ACS    Plan:  - Transferred to telemetry for ACS/PE rule out  -  Echo f/up  - NPO at midnight for possible stress test, pending approval of attending in AM  - Troponin q4hr (2 more, first two too close together)  - EKG q4hr (2more, first two too close together)  - EKG and troponin STAT PRN new or recurrent chest pain  - Aspirin 325mg  - SCD for DVT ppx  - Continuous pulse ox  - Telemetry  - repeat CBC, BMP, Coag in AM  - Zofran PRN nausea    Core measures  Code: Full  PCP: None  GI ppx: None  DVT ppx: SCDs  ABX: None  Post op pain: per OB    Michelle Ospina MD, PGY-1

## 2018-12-03 ENCOUNTER — APPOINTMENT (OUTPATIENT)
Dept: CARDIOLOGY | Facility: MEDICAL CENTER | Age: 28
End: 2018-12-03
Attending: STUDENT IN AN ORGANIZED HEALTH CARE EDUCATION/TRAINING PROGRAM
Payer: COMMERCIAL

## 2018-12-03 LAB
ANION GAP SERPL CALC-SCNC: 5 MMOL/L (ref 0–11.9)
BUN SERPL-MCNC: 4 MG/DL (ref 8–22)
CALCIUM SERPL-MCNC: 8.2 MG/DL (ref 8.5–10.5)
CHLORIDE SERPL-SCNC: 107 MMOL/L (ref 96–112)
CHOLEST SERPL-MCNC: 185 MG/DL (ref 100–199)
CO2 SERPL-SCNC: 22 MMOL/L (ref 20–33)
CREAT SERPL-MCNC: 0.36 MG/DL (ref 0.5–1.4)
ERYTHROCYTE [DISTWIDTH] IN BLOOD BY AUTOMATED COUNT: 42.9 FL (ref 35.9–50)
EST. AVERAGE GLUCOSE BLD GHB EST-MCNC: 123 MG/DL
GLUCOSE SERPL-MCNC: 91 MG/DL (ref 65–99)
HBA1C MFR BLD: 5.9 % (ref 0–5.6)
HCT VFR BLD AUTO: 31.3 % (ref 37–47)
HDLC SERPL-MCNC: 44 MG/DL
HGB BLD-MCNC: 10.7 G/DL (ref 12–16)
LDLC SERPL CALC-MCNC: 96 MG/DL
LV EJECT FRACT  99904: 60
LV EJECT FRACT MOD 2C 99903: 62.86
LV EJECT FRACT MOD 4C 99902: 57.68
LV EJECT FRACT MOD BP 99901: 52.25
MCH RBC QN AUTO: 31 PG (ref 27–33)
MCHC RBC AUTO-ENTMCNC: 34.2 G/DL (ref 33.6–35)
MCV RBC AUTO: 90.7 FL (ref 81.4–97.8)
PLATELET # BLD AUTO: 159 K/UL (ref 164–446)
PMV BLD AUTO: 10.9 FL (ref 9–12.9)
POTASSIUM SERPL-SCNC: 3.8 MMOL/L (ref 3.6–5.5)
RBC # BLD AUTO: 3.45 M/UL (ref 4.2–5.4)
SODIUM SERPL-SCNC: 134 MMOL/L (ref 135–145)
TRIGL SERPL-MCNC: 227 MG/DL (ref 0–149)
TSH SERPL DL<=0.005 MIU/L-ACNC: 2.65 UIU/ML (ref 0.38–5.33)
WBC # BLD AUTO: 11.7 K/UL (ref 4.8–10.8)

## 2018-12-03 PROCEDURE — 36415 COLL VENOUS BLD VENIPUNCTURE: CPT

## 2018-12-03 PROCEDURE — 700111 HCHG RX REV CODE 636 W/ 250 OVERRIDE (IP): Performed by: OBSTETRICS & GYNECOLOGY

## 2018-12-03 PROCEDURE — 83036 HEMOGLOBIN GLYCOSYLATED A1C: CPT

## 2018-12-03 PROCEDURE — 93306 TTE W/DOPPLER COMPLETE: CPT

## 2018-12-03 PROCEDURE — A9270 NON-COVERED ITEM OR SERVICE: HCPCS | Performed by: STUDENT IN AN ORGANIZED HEALTH CARE EDUCATION/TRAINING PROGRAM

## 2018-12-03 PROCEDURE — 770002 HCHG ROOM/CARE - OB PRIVATE (112)

## 2018-12-03 PROCEDURE — 80048 BASIC METABOLIC PNL TOTAL CA: CPT

## 2018-12-03 PROCEDURE — 85027 COMPLETE CBC AUTOMATED: CPT

## 2018-12-03 PROCEDURE — 84443 ASSAY THYROID STIM HORMONE: CPT

## 2018-12-03 PROCEDURE — A9270 NON-COVERED ITEM OR SERVICE: HCPCS | Performed by: OBSTETRICS & GYNECOLOGY

## 2018-12-03 PROCEDURE — 700102 HCHG RX REV CODE 250 W/ 637 OVERRIDE(OP): Performed by: OBSTETRICS & GYNECOLOGY

## 2018-12-03 PROCEDURE — 93306 TTE W/DOPPLER COMPLETE: CPT | Mod: 26 | Performed by: INTERNAL MEDICINE

## 2018-12-03 PROCEDURE — 700102 HCHG RX REV CODE 250 W/ 637 OVERRIDE(OP): Performed by: STUDENT IN AN ORGANIZED HEALTH CARE EDUCATION/TRAINING PROGRAM

## 2018-12-03 PROCEDURE — 80061 LIPID PANEL: CPT

## 2018-12-03 RX ORDER — OXYCODONE HYDROCHLORIDE AND ACETAMINOPHEN 5; 325 MG/1; MG/1
2 TABLET ORAL EVERY 4 HOURS PRN
Status: DISCONTINUED | OUTPATIENT
Start: 2018-12-03 | End: 2018-12-03

## 2018-12-03 RX ORDER — OXYCODONE HYDROCHLORIDE AND ACETAMINOPHEN 5; 325 MG/1; MG/1
1 TABLET ORAL EVERY 4 HOURS PRN
Status: DISCONTINUED | OUTPATIENT
Start: 2018-12-03 | End: 2018-12-03

## 2018-12-03 RX ORDER — IBUPROFEN 600 MG/1
600 TABLET ORAL EVERY 6 HOURS PRN
Status: DISCONTINUED | OUTPATIENT
Start: 2018-12-03 | End: 2018-12-05 | Stop reason: HOSPADM

## 2018-12-03 RX ORDER — FERROUS GLUCONATE 324(38)MG
324 TABLET ORAL
Status: DISCONTINUED | OUTPATIENT
Start: 2018-12-03 | End: 2018-12-05 | Stop reason: HOSPADM

## 2018-12-03 RX ADMIN — OXYCODONE AND ACETAMINOPHEN 2 TABLET: 5; 325 TABLET ORAL at 18:11

## 2018-12-03 RX ADMIN — IBUPROFEN 600 MG: 600 TABLET, FILM COATED ORAL at 22:13

## 2018-12-03 RX ADMIN — KETOROLAC TROMETHAMINE 30 MG: 30 INJECTION, SOLUTION INTRAMUSCULAR at 15:05

## 2018-12-03 RX ADMIN — OXYCODONE AND ACETAMINOPHEN 2 TABLET: 5; 325 TABLET ORAL at 22:13

## 2018-12-03 RX ADMIN — IBUPROFEN 600 MG: 600 TABLET, FILM COATED ORAL at 08:04

## 2018-12-03 RX ADMIN — KETOROLAC TROMETHAMINE 30 MG: 30 INJECTION, SOLUTION INTRAMUSCULAR at 09:59

## 2018-12-03 RX ADMIN — OXYCODONE AND ACETAMINOPHEN 2 TABLET: 5; 325 TABLET ORAL at 08:08

## 2018-12-03 RX ADMIN — OXYCODONE AND ACETAMINOPHEN 2 TABLET: 5; 325 TABLET ORAL at 12:20

## 2018-12-03 RX ADMIN — KETOROLAC TROMETHAMINE 30 MG: 30 INJECTION, SOLUTION INTRAMUSCULAR at 03:35

## 2018-12-03 RX ADMIN — FERROUS GLUCONATE 324 MG: 324 TABLET ORAL at 09:54

## 2018-12-03 RX ADMIN — ASPIRIN 325 MG: 325 TABLET, COATED ORAL at 05:57

## 2018-12-03 ASSESSMENT — PATIENT HEALTH QUESTIONNAIRE - PHQ9
1. LITTLE INTEREST OR PLEASURE IN DOING THINGS: NOT AT ALL
2. FEELING DOWN, DEPRESSED, IRRITABLE, OR HOPELESS: NOT AT ALL
SUM OF ALL RESPONSES TO PHQ9 QUESTIONS 1 AND 2: 0

## 2018-12-03 ASSESSMENT — PAIN SCALES - GENERAL
PAINLEVEL_OUTOF10: 4
PAINLEVEL_OUTOF10: 4
PAINLEVEL_OUTOF10: 3
PAINLEVEL_OUTOF10: 7
PAINLEVEL_OUTOF10: 0
PAINLEVEL_OUTOF10: 7
PAINLEVEL_OUTOF10: 3
PAINLEVEL_OUTOF10: 3
PAINLEVEL_OUTOF10: 10
PAINLEVEL_OUTOF10: 5
PAINLEVEL_OUTOF10: 8

## 2018-12-03 NOTE — PROGRESS NOTES
Spoke to Xiomara LAMA and gave update on feeding plan for infant.  Plan will be to take infant to MOB for midnight feed.

## 2018-12-03 NOTE — PROGRESS NOTES
Infant brought to MOB room.  Discussed feeding plan with parents, questions answered.  Assisted MOB with latching infant on left side.  Reviewed pump settings, frequency and duration.  Questions answered.  Provided parents with charge RN phone number.

## 2018-12-03 NOTE — PROGRESS NOTES
OB Progress Note    Alejandra Reyes Villegas   Post-op day 1  Chief Admitting Dx: Pregnancy SROM, previous  section x 1   Labor and delivery indication for care or intervention  Delivery Type:  for repeat      Subjective:  No vaginal bleeding  Pain is moderately controlled   Ambulating: no  Tolerating oral feed: yes  Flatus: yes    Voiding: no/schroeder  Dizziness: no  Vitals:    18 1553 18 1920 18 2338 18 0333   BP:  104/66 113/65 (!) 94/65   Pulse:  94 89 91   Resp:     Temp:  36 °C (96.8 °F) 36.4 °C (97.6 °F) 36.4 °C (97.6 °F)   TempSrc:  Temporal Temporal Temporal   SpO2: 96% 95% 98% 94%   Weight:       Height:           Exam:  Abdomen: Abdomen soft, non-tender. BS normal. No masses,  No organomegaly  Incision: dry and intact dressing   Fundus Tenderness: no  Below umbilicus: yes  Perineum: perineum intact  Extremities: Normal  Lochia: mild    Labs:   Recent Results (from the past 24 hour(s))   Ferning if suspected rupture of membranes (ROM)    Collection Time: 18  9:25 AM   Result Value Ref Range    Fern Test On Amniotic Fluid see below Not present   Hold Blood Bank Specimen (Not Tested)    Collection Time: 18 10:30 AM   Result Value Ref Range    Holding Tube - Bb DONE    CBC with Differential    Collection Time: 18 10:30 AM   Result Value Ref Range    WBC 9.9 4.8 - 10.8 K/uL    RBC 4.30 4.20 - 5.40 M/uL    Hemoglobin 13.2 12.0 - 16.0 g/dL    Hematocrit 38.6 37.0 - 47.0 %    MCV 89.8 81.4 - 97.8 fL    MCH 30.7 27.0 - 33.0 pg    MCHC 34.2 33.6 - 35.0 g/dL    RDW 42.7 35.9 - 50.0 fL    Platelet Count 179 164 - 446 K/uL    MPV 11.3 9.0 - 12.9 fL    Neutrophils-Polys 70.10 44.00 - 72.00 %    Lymphocytes 20.70 (L) 22.00 - 41.00 %    Monocytes 6.60 0.00 - 13.40 %    Eosinophils 0.60 0.00 - 6.90 %    Basophils 0.30 0.00 - 1.80 %    Immature Granulocytes 1.70 (H) 0.00 - 0.90 %    Nucleated RBC 0.00 /100 WBC    Neutrophils (Absolute) 6.95 2.00 - 7.15 K/uL     Lymphs (Absolute) 2.05 1.00 - 4.80 K/uL    Monos (Absolute) 0.65 0.00 - 0.85 K/uL    Eos (Absolute) 0.06 0.00 - 0.51 K/uL    Baso (Absolute) 0.03 0.00 - 0.12 K/uL    Immature Granulocytes (abs) 0.17 (H) 0.00 - 0.11 K/uL    NRBC (Absolute) 0.00 K/uL   EKG    Collection Time: 18  1:35 PM   Result Value Ref Range    Report       [facility]    Test Date:  2018  Pt Name:    ALEJANDRA REYES VILLEGAS     Department: 21  MRN:        [id number]                      Room:       [facility]  Gender:     Female                       Technician: [facility]  :        1990                   Requested By:TOBEY B GANSERT  Order #:    [id number]                    Reading MD: Bud Marrufo MD    Measurements  Intervals                                Axis  Rate:       106                          P:          45  NH:         124                          QRS:        83  QRSD:       76                           T:          11  QT:         360  QTc:        479    Interpretive Statements  SINUS TACHYCARDIA  BORDERLINE PROLONGED QT INTERVAL  No previous ECG available for comparison    Electronically Signed On 2018 13:55:15 PST by Bud Marrufo MD     TROPONIN    Collection Time: 18  1:39 PM   Result Value Ref Range    Troponin I <0.01 0.00 - 0.04 ng/mL   CBC WITH DIFFERENTIAL    Collection Time: 18  1:39 PM   Result Value Ref Range    WBC 14.3 (H) 4.8 - 10.8 K/uL    RBC 4.06 (L) 4.20 - 5.40 M/uL    Hemoglobin 12.4 12.0 - 16.0 g/dL    Hematocrit 37.2 37.0 - 47.0 %    MCV 91.6 81.4 - 97.8 fL    MCH 30.5 27.0 - 33.0 pg    MCHC 33.3 (L) 33.6 - 35.0 g/dL    RDW 43.5 35.9 - 50.0 fL    Platelet Count 168 164 - 446 K/uL    MPV 11.1 9.0 - 12.9 fL    Neutrophils-Polys 78.90 (H) 44.00 - 72.00 %    Lymphocytes 13.70 (L) 22.00 - 41.00 %    Monocytes 5.20 0.00 - 13.40 %    Eosinophils 0.30 0.00 - 6.90 %    Basophils 0.30 0.00 - 1.80 %    Immature Granulocytes 1.60 (H) 0.00 - 0.90 %    Nucleated RBC 0.00 /100 WBC     Neutrophils (Absolute) 11.27 (H) 2.00 - 7.15 K/uL    Lymphs (Absolute) 1.95 1.00 - 4.80 K/uL    Monos (Absolute) 0.74 0.00 - 0.85 K/uL    Eos (Absolute) 0.04 0.00 - 0.51 K/uL    Baso (Absolute) 0.04 0.00 - 0.12 K/uL    Immature Granulocytes (abs) 0.23 (H) 0.00 - 0.11 K/uL    NRBC (Absolute) 0.00 K/uL   COMP METABOLIC PANEL    Collection Time: 18  1:58 PM   Result Value Ref Range    Sodium 137 135 - 145 mmol/L    Potassium 3.4 (L) 3.6 - 5.5 mmol/L    Chloride 107 96 - 112 mmol/L    Co2 19 (L) 20 - 33 mmol/L    Anion Gap 11.0 0.0 - 11.9    Glucose 101 (H) 65 - 99 mg/dL    Bun 3 (L) 8 - 22 mg/dL    Creatinine 0.41 (L) 0.50 - 1.40 mg/dL    Calcium 7.7 (L) 8.5 - 10.5 mg/dL    AST(SGOT) 13 12 - 45 U/L    ALT(SGPT) 10 2 - 50 U/L    Alkaline Phosphatase 157 (H) 30 - 99 U/L    Total Bilirubin 0.6 0.1 - 1.5 mg/dL    Albumin 3.0 (L) 3.2 - 4.9 g/dL    Total Protein 5.9 (L) 6.0 - 8.2 g/dL    Globulin 2.9 1.9 - 3.5 g/dL    A-G Ratio 1.0 g/dL   APTT    Collection Time: 18  1:58 PM   Result Value Ref Range    APTT 26.9 24.7 - 36.0 sec   PROTHROMBIN TIME    Collection Time: 18  1:58 PM   Result Value Ref Range    PT 13.8 12.0 - 14.6 sec    INR 1.05 0.87 - 1.13   ESTIMATED GFR    Collection Time: 18  1:58 PM   Result Value Ref Range    GFR If African American >60 >60 mL/min/1.73 m 2    GFR If Non African American >60 >60 mL/min/1.73 m 2   EKG    Collection Time: 18  3:03 PM   Result Value Ref Range    Report       [facility]    Test Date:  2018  Pt Name:    ALEJANDRA REYES VILLEGAS     Department: 21  MRN:        [id number]                      Room:       T721  Gender:     Female                       Technician: [facility]  :        1990                   Requested By:UMER JAVED  Order #:    [id number]                    Reading MD: Lawson Slater MD    Measurements  Intervals                                Axis  Rate:       89                           P:          49  WY:          132                          QRS:        69  QRSD:       82                           T:          7  QT:         368  QTc:        448    Interpretive Statements  SINUS RHYTHM  Compared to ECG 12/02/2018 13:35:41  Sinus tachycardia no longer present    Electronically Signed On 12-2-2018 17:21:04 PST by Lawson Slater MD     Hemoglobin and Hematocrit    Collection Time: 12/02/18  3:18 PM   Result Value Ref Range    Hemoglobin 11.6 (L) 12.0 - 16.0 g/dL    Hematocrit 35.9 (L) 37.0 - 47.0 %   Basic Metabolic Panel (BMP)    Collection Time: 12/02/18  3:18 PM   Result Value Ref Range    Sodium 137 135 - 145 mmol/L    Potassium 3.4 (L) 3.6 - 5.5 mmol/L    Chloride 108 96 - 112 mmol/L    Co2 18 (L) 20 - 33 mmol/L    Glucose 100 (H) 65 - 99 mg/dL    Bun 3 (L) 8 - 22 mg/dL    Creatinine 0.32 (L) 0.50 - 1.40 mg/dL    Calcium 7.8 (L) 8.5 - 10.5 mg/dL    Anion Gap 11.0 0.0 - 11.9   Prothrombin time (INR)    Collection Time: 12/02/18  3:18 PM   Result Value Ref Range    PT 13.5 12.0 - 14.6 sec    INR 1.02 0.87 - 1.13   APTT (PTT)    Collection Time: 12/02/18  3:18 PM   Result Value Ref Range    APTT 27.4 24.7 - 36.0 sec   Arterial Blood Gas (ABG)    Collection Time: 12/02/18  3:18 PM   Result Value Ref Range    Ph 7.41 7.40 - 7.50    Pco2 30.7 26.0 - 37.0 mmHg    Po2 94.1 (H) 64.0 - 87.0 mmHg    O2 Saturation 96.5 93.0 - 99.0 %    Hco3 19 17 - 25 mmol/L    Base Excess -4 -4 - 3 mmol/L    Body Temp 36.5 Centigrade    Ph -TC 7.42 7.40 - 7.50    Pco2 -TC 30.0 26.0 - 37.0 mmHg    Po2 -TC 91.2 (H) 64.0 - 87.0 mmHg   MAGNESIUM    Collection Time: 12/02/18  3:18 PM   Result Value Ref Range    Magnesium 1.7 1.5 - 2.5 mg/dL   TROPONIN    Collection Time: 12/02/18  3:18 PM   Result Value Ref Range    Troponin I <0.01 0.00 - 0.04 ng/mL   ESTIMATED GFR    Collection Time: 12/02/18  3:18 PM   Result Value Ref Range    GFR If African American >60 >60 mL/min/1.73 m 2    GFR If Non African American >60 >60 mL/min/1.73 m 2   TROPONIN    Collection  Time: 18  6:31 PM   Result Value Ref Range    Troponin I <0.01 0.00 - 0.04 ng/mL   EKG    Collection Time: 18  7:02 PM   Result Value Ref Range    Report       [facility]    Test Date:  2018  Pt Name:    ALEJANDRA REYES VILLEGAS     Department: 171  MRN:        [id number]                      Room:       T721  Gender:     Female                       Technician: SORAIDA  :        1990                   Requested By:UMER JAVED  Order #:    [id number]                    Reading MD: Remy Garza MD    Measurements  Intervals                                Axis  Rate:       94                           P:          35  MA:         144                          QRS:        62  QRSD:       76                           T:          11  QT:         352  QTc:        441    Interpretive Statements  SINUS RHYTHM  Compared to ECG 2018 15:03:10  No significant changes    Electronically Signed On 2018 19:18:15 PST by Remy Garza MD     TROPONIN    Collection Time: 18  9:48 PM   Result Value Ref Range    Troponin I <0.01 0.00 - 0.04 ng/mL   CBC without differential    Collection Time: 18  2:31 AM   Result Value Ref Range    WBC 11.7 (H) 4.8 - 10.8 K/uL    RBC 3.45 (L) 4.20 - 5.40 M/uL    Hemoglobin 10.7 (L) 12.0 - 16.0 g/dL    Hematocrit 31.3 (L) 37.0 - 47.0 %    MCV 90.7 81.4 - 97.8 fL    MCH 31.0 27.0 - 33.0 pg    MCHC 34.2 33.6 - 35.0 g/dL    RDW 42.9 35.9 - 50.0 fL    Platelet Count 159 (L) 164 - 446 K/uL    MPV 10.9 9.0 - 12.9 fL   BASIC METABOLIC PANEL    Collection Time: 18  5:57 AM   Result Value Ref Range    Sodium 134 (L) 135 - 145 mmol/L    Potassium 3.8 3.6 - 5.5 mmol/L    Chloride 107 96 - 112 mmol/L    Co2 22 20 - 33 mmol/L    Glucose 91 65 - 99 mg/dL    Bun 4 (L) 8 - 22 mg/dL    Creatinine 0.36 (L) 0.50 - 1.40 mg/dL    Calcium 8.2 (L) 8.5 - 10.5 mg/dL    Anion Gap 5.0 0.0 - 11.9   ESTIMATED GFR    Collection Time: 18  5:57 AM   Result Value Ref  Range    GFR If African American >60 >60 mL/min/1.73 m 2    GFR If Non African American >60 >60 mL/min/1.73 m 2       Assessment:  Stable    Plan:  Continue postpartum care  Percocet/ibuprofen for pain  May d/c schroeder  Ambulate  Breast feed  Lactation consult  D/c dressing this PM    Jenna Toscano M.D.

## 2018-12-03 NOTE — PROGRESS NOTES
Received report from DAINA Angeles. Pt arrived on the floor. Full assessment complete. A&Ox4, pleasant. Primarily Bengali speaking,  used. Reports moderate lower abdominal pain, medicated per MAR. Funds firm, lochia light. Dressing over lower abdominal surgical incision CDI. All needs met at this time per pt

## 2018-12-03 NOTE — PROGRESS NOTES
[facility] [place]ILY MEDICINE PROGRESS NOTE     Attending: Dr. Ernestina Ibarra    Resident: Dr. Michelle Ospina PGY-1    PATIENT: Alejandra Reyes Villegas; [id number]; 1990    ID: 28 y.o. female admitted for delivery term infant. Developed acute intense left anterior chest pain in the PACU s/p csx. She was transferred to telemetry for ACS rule out.     SUBJECTIVE: No acute events overnight. Chest pain is now minimal. She has has no more episodes of shortness or breath. She mainly complains of pain from csection and back pain. Denies palpitations/n/v/chills/SOB. Both  and patient are not wanting to have stress test.     OBJECTIVE:     Vitals:    12/02/18 1553 12/02/18 1920 12/02/18 2338 12/03/18 0333   BP:  104/66 113/65 (!) 94/65   Pulse:  94 89 91   Resp:  17 17 18   Temp:  36 °C (96.8 °F) 36.4 °C (97.6 °F) 36.4 °C (97.6 °F)   TempSrc:  Temporal Temporal Temporal   SpO2: 96% 95% 98% 94%   Weight:       Height:           Intake/Output Summary (Last 24 hours) at 12/03/18 0540  Last data filed at 12/02/18 2300   Gross per 24 hour   Intake               60 ml   Output             1475 ml   Net            -1415 ml       PE:  General: No acute distress, resting some what uncomfortably in bed- hands over lower abdomen  HEENT: NC/AT. PERRLA. EOMI. MMM  Cardiovascular: RRR with no R/G,+ soft 2/6 MONI heard throughout  Respiratory: Symmetrical chest. CTAB with no W/R/R  Abdomen: post partum abdomen appropriately distended w/ firm fundus palpated just below the umbilicus, dressing to the lower abdomen covering csection incision- she is appropriately tender. +BS  EXT:  MEJÍA, %/5 strength, No C/C/E 2+ pulses   Neuro: non focal with no numbness, tingling or changes in sensation    LABS:  Recent Labs      12/02/18   1030  12/02/18   1339  12/02/18   1518  12/03/18   0231   WBC  9.9  14.3*   --   11.7*   RBC  4.30  4.06*   --   3.45*   HEMOGLOBIN  13.2  12.4  11.6*  10.7*   HEMATOCRIT  38.6  37.2  35.9*  31.3*   MCV  89.8  91.6   --    90.7   MCH  30.7  30.5   --   31.0   RDW  42.7  43.5   --   42.9   PLATELETCT  179  168   --   159*   MPV  11.3  11.1   --   10.9   NEUTSPOLYS  70.10  78.90*   --    --    LYMPHOCYTES  20.70*  13.70*   --    --    MONOCYTES  6.60  5.20   --    --    EOSINOPHILS  0.60  0.30   --    --    BASOPHILS  0.30  0.30   --    --      Recent Labs      12/02/18   1358  12/02/18   1518   SODIUM  137  137   POTASSIUM  3.4*  3.4*   CHLORIDE  107  108   CO2  19*  18*   BUN  3*  3*   CREATININE  0.41*  0.32*   CALCIUM  7.7*  7.8*   MAGNESIUM   --   1.7   ALBUMIN  3.0*   --      Estimated GFR/CRCL = Estimated Creatinine Clearance: 231 mL/min (A) (by C-G formula based on SCr of 0.32 mg/dL (L)).  Recent Labs      12/02/18   1358  12/02/18   1518   GLUCOSE  101*  100*     Recent Labs      12/02/18   1358  12/02/18   1518   ASTSGOT  13   --    ALTSGPT  10   --    TBILIRUBIN  0.6   --    ALKPHOSPHAT  157*   --    GLOBULIN  2.9   --    INR  1.05  1.02     Recent Labs      12/02/18   1518  12/02/18   1831  12/02/18   2148   TROPONINI  <0.01  <0.01  <0.01     Recent Labs      12/02/18   1518   ZMLWX69T  7.41   INBGCK728N  30.7   ARJQC394L  94.1*   AXQD9QXH  96.5   ARTHCO3  19   ARTBE  -4     Recent Labs      12/02/18   1358  12/02/18   1518   INR  1.05  1.02   APTT  26.9  27.4       MICROBIOLOGY: None    IMAGING:   CTA: No central or large segmental pulmonary embolus is identified. No acute cardiopulmonary process is seen. Free intraperitoneal air is likely related to recent surgery    MEDS:  Current Facility-Administered Medications   Medication Last Dose   • lactated ringers (LR) infusion     • LR infusion     • oxytocin (PITOCIN) infusion (for postpartum)      Followed by   • oxytocin (PITOCIN) infusion (for postpartum) 125 mL/hr at 12/02/18 1916   • LR infusion     • PRN oxytocin (PITOCIN) (20 Units/1000 mL) PRN for excessive uterine bleeding - See Admin Instr     • miSOPROStol (CYTOTEC) tablet 800 mcg     • docusate sodium (COLACE)  capsule 100 mg     • simethicone (MYLICON) chewable tab 80 mg     • ibuprofen (MOTRIN) tablet 600 mg     • ketorolac (TORADOL) injection 30 mg 30 mg at 12/03/18 0335    Or   • ketorolac (TORADOL) injection 30 mg     • oxyCODONE-acetaminophen (PERCOCET) 5-325 MG per tablet 1 Tab     • oxyCODONE-acetaminophen (PERCOCET) 5-325 MG per tablet 2 Tab 2 Tab at 12/02/18 1551   • morphine (pf) 10 mg/mL injection 4 mg     • acetaminophen (TYLENOL) tablet 1,000 mg     • oxyCODONE immediate-release (ROXICODONE) tablet 5 mg     • HYDROmorphone pf (DILAUDID) injection 0.2 mg     • ondansetron (ZOFRAN) syringe/vial injection 4 mg 4 mg at 12/02/18 1605   • diphenhydrAMINE (BENADRYL) injection 12.5 mg      Or   • diphenhydrAMINE (BENADRYL) injection 25 mg      Or   • naloxone (NARCAN) 0.4 mg in NS 1,000 mL infusion     • aspirin (ASA) tablet 325 mg 325 mg at 12/02/18 1556    Or   • aspirin (ASA) chewable tab 324 mg      Or   • aspirin (ASA) suppository 300 mg         PROBLEM LIST:  No problems updated.    ASSESSMENT/PLAN: 28 y.o. female admitted for delivery of a term infant via csx. Consulted for acute chest pain following csxn. Transferred to telemetry for ACS/PE r/o.     #Chest pain acute onset- improved  # Shortness of breath- resolved  #Tachycardia- resolved  - No heart history or history of blood clots  - Onset s/p csx in PACU, described as chest pressure with non radiating  - Alleviated with one time albuterol nebulizer treatment, now described as minimal  - Associated with shortness of breath and tachycardia. Otherwise normotensive  - No HA/double vision/altered mental status/speech changes/dizziness  - EKG wnl x3, Troponin negative x3  - Mg 1.7, CBC, BMP coags wnl  - CXR: negative,  CTA for PE: negative  -JP Score low (1) %5 all cause mortality, HEART score 0 , 6-week risk if major adverse cardiac events    Plan:  -  Echo f/up  - F/up TSH, lipid and HgbA1C  - No stress test  - Will transfer back to post partum     Core  measures  Code: Full  PCP: None  GI ppx: None  DVT ppx: SCDs  ABX: None  Post op pain: per OB    Dispo: will transfer back to post partum today. Recommend patient establish primary care physician for follow up. [facility] available    Michelle Ospina MD, PGY-1

## 2018-12-03 NOTE — LACTATION NOTE
Basics of hospital grade breast pump use introduced today with mother. Written information to help support frequency and duration provided. Plan is to follow this mother while baby remains hospitalized to ensure the establishment and subsequent maintenance of adequate milk supply, and help direct her to appropriate resources. present at bedside to assist with pumping instructions.

## 2018-12-03 NOTE — DISCHARGE PLANNING
Discharge Planning Assessment Post Partum     Reason for Referral: History of depression  Address: [address] [place] [address] [address] [place] [address]  Phone: [phone]  Type of Living Situation: living with FOB and children  Mom Diagnosis: Pregnancy  Baby Diagnosis:   Primary Language: MOB is Sami speaking only, FOB translated     Name of Baby: Ricardo Padilla Reyes (: 18)  Father of the Baby: Waylon Le (89)   Involved in baby’s care? Yes  Contact Information: [phone]     Prenatal Care: Yes  Mom's PCP: None  PCP for new baby: Pediatrician list provided     Support System: FOB  Coping/Bonding between mother & baby: Yes  Source of Feeding: breast  Supplies for Infant: prepared, denies any needs     Mom's Insurance: [employer]  Baby Covered on Insurance:Yes  Mother Employed/School: No  Other children in the home/names & ages: 9 year old son, Abel Padilla Reyes (10/23/09)     Financial Hardship/Income: denies   Mom's Mental status: alert and oriented  Services used prior to admit: [facility]     CPS History: denies  Psychiatric History: depression, provided counseling resource for post partum depression  Domestic Violence History: denies  Drug/ETOH History: denies     Resources Provided: pediatrician list, children and family resource list, counseling resource for post partum depression  Referrals Made: diaper bank referral provided      Clearance for Discharge: Infant is cleared to discharge home with parents.

## 2018-12-03 NOTE — PROGRESS NOTES
Postpartum charge RN brought baby to bedside. Pt states she does have a little pain but doesn't want anything pain medication. Offered nonpharm options for pain. Pt still refusing.  at bedside. Call light within reach.

## 2018-12-03 NOTE — CARE PLAN
Problem: Safety  Goal: Will remain free from falls  Outcome: PROGRESSING AS EXPECTED  Patient resting in bed with call light in reach. Bed in low position. Progressing as expecting.     Problem: Infection  Goal: Will remain free from infection  Outcome: PROGRESSING AS EXPECTED  No S/S of infection present at this time. Will continue to monitor. Progressing as expected.

## 2018-12-03 NOTE — CARE PLAN
Problem: Communication  Goal: The ability to communicate needs accurately and effectively will improve  Outcome: PROGRESSING AS EXPECTED  Encourage pt to voice concerns and feelings    Problem: Safety  Goal: Will remain free from falls  Outcome: PROGRESSING AS EXPECTED  Hourly rounding, bed low and locked, area free of clutter, nonskid socks on, call light within reach

## 2018-12-04 PROCEDURE — 700111 HCHG RX REV CODE 636 W/ 250 OVERRIDE (IP): Performed by: OBSTETRICS & GYNECOLOGY

## 2018-12-04 PROCEDURE — A9270 NON-COVERED ITEM OR SERVICE: HCPCS | Performed by: OBSTETRICS & GYNECOLOGY

## 2018-12-04 PROCEDURE — A9270 NON-COVERED ITEM OR SERVICE: HCPCS | Performed by: STUDENT IN AN ORGANIZED HEALTH CARE EDUCATION/TRAINING PROGRAM

## 2018-12-04 PROCEDURE — 770002 HCHG ROOM/CARE - OB PRIVATE (112)

## 2018-12-04 PROCEDURE — 700102 HCHG RX REV CODE 250 W/ 637 OVERRIDE(OP): Performed by: STUDENT IN AN ORGANIZED HEALTH CARE EDUCATION/TRAINING PROGRAM

## 2018-12-04 PROCEDURE — 700102 HCHG RX REV CODE 250 W/ 637 OVERRIDE(OP): Performed by: OBSTETRICS & GYNECOLOGY

## 2018-12-04 PROCEDURE — 700112 HCHG RX REV CODE 229: Performed by: OBSTETRICS & GYNECOLOGY

## 2018-12-04 RX ORDER — ONDANSETRON 4 MG/1
4 TABLET, ORALLY DISINTEGRATING ORAL EVERY 4 HOURS PRN
Status: DISCONTINUED | OUTPATIENT
Start: 2018-12-04 | End: 2018-12-05 | Stop reason: HOSPADM

## 2018-12-04 RX ADMIN — OXYCODONE AND ACETAMINOPHEN 1 TABLET: 5; 325 TABLET ORAL at 16:33

## 2018-12-04 RX ADMIN — SIMETHICONE CHEW TAB 80 MG 80 MG: 80 TABLET ORAL at 05:59

## 2018-12-04 RX ADMIN — FERROUS GLUCONATE 324 MG: 324 TABLET ORAL at 08:02

## 2018-12-04 RX ADMIN — SIMETHICONE CHEW TAB 80 MG 80 MG: 80 TABLET ORAL at 12:09

## 2018-12-04 RX ADMIN — OXYCODONE AND ACETAMINOPHEN 2 TABLET: 5; 325 TABLET ORAL at 03:05

## 2018-12-04 RX ADMIN — IBUPROFEN 600 MG: 600 TABLET, FILM COATED ORAL at 17:56

## 2018-12-04 RX ADMIN — IBUPROFEN 600 MG: 600 TABLET, FILM COATED ORAL at 12:09

## 2018-12-04 RX ADMIN — IBUPROFEN 600 MG: 600 TABLET, FILM COATED ORAL at 06:00

## 2018-12-04 RX ADMIN — ONDANSETRON 4 MG: 4 TABLET, ORALLY DISINTEGRATING ORAL at 09:43

## 2018-12-04 RX ADMIN — DOCUSATE SODIUM 100 MG: 100 CAPSULE, LIQUID FILLED ORAL at 06:00

## 2018-12-04 RX ADMIN — OXYCODONE AND ACETAMINOPHEN 2 TABLET: 5; 325 TABLET ORAL at 08:05

## 2018-12-04 RX ADMIN — OXYCODONE AND ACETAMINOPHEN 2 TABLET: 5; 325 TABLET ORAL at 20:48

## 2018-12-04 RX ADMIN — ASPIRIN 324 MG: 81 TABLET, CHEWABLE ORAL at 05:59

## 2018-12-04 RX ADMIN — OXYCODONE AND ACETAMINOPHEN 2 TABLET: 5; 325 TABLET ORAL at 12:09

## 2018-12-04 ASSESSMENT — PAIN SCALES - GENERAL
PAINLEVEL_OUTOF10: 8
PAINLEVEL_OUTOF10: 10
PAINLEVEL_OUTOF10: 4
PAINLEVEL_OUTOF10: 5
PAINLEVEL_OUTOF10: 3
PAINLEVEL_OUTOF10: 10
PAINLEVEL_OUTOF10: 5
PAINLEVEL_OUTOF10: 4
PAINLEVEL_OUTOF10: 5
PAINLEVEL_OUTOF10: 6

## 2018-12-04 NOTE — PROGRESS NOTES
Pt vomiting during rounding, stating she has been vomiting after meals. Diet changed to clear liquid diet, discussed with pt. MD paged to update and notify of lack of PRN medication at this time.     7774--Dr. Reza West updated. New orders for PRN zofran. Dr. Reza West states that she will notify [facility] that this pt needs to be seen by [facility] MDs.    1200--MD during stay to be [facility]. Dr. Geronimo assessed pt at approximately 1000 and discussed with pt.

## 2018-12-04 NOTE — PROGRESS NOTES
Pt stated she felt nauseas, was given an emesis bag and medicated for pain. Will continue to monitor. FOB at bedside.

## 2018-12-04 NOTE — CARE PLAN
Problem: Altered physiologic condition related to postoperative  delivery  Goal: Patient physiologically stable as evidenced by normal lochia, palpable uterine involution and vital signs within normal limits  Outcome: PROGRESSING AS EXPECTED  Fundus firm, lochia light to scant rubra.     Problem: Alteration in comfort related to surgical incision and/or after birth pains  Goal: Patient verbalizes acceptable pain level  Outcome: PROGRESSING AS EXPECTED  Reviewed 0-10 pain scale and available pain medication with the pt. Pt will call for pain meds as needed.

## 2018-12-04 NOTE — PROGRESS NOTES
Received report from Xiomara LAMA.  Assumed patient care. Assessment complete. Pt will take a shower and then we will remove the dressing on the incision. Pt has been breastfeeding and states it is going well. Advised pt to call me at next feeding to assess latch. Will continue postpartum care.

## 2018-12-04 NOTE — CARE PLAN
Problem: Safety  Goal: Will remain free from injury  Outcome: PROGRESSING AS EXPECTED  Patient was educated on level of fall risk and provided treaded socks, will call for assistance. Pt acknowledged an understanding.

## 2018-12-04 NOTE — CARE PLAN
Problem: Altered physiologic condition related to postoperative  delivery  Goal: Patient physiologically stable as evidenced by normal lochia, palpable uterine involution and vital signs within normal limits  Outcome: PROGRESSING AS EXPECTED  Fundus firm, lochia light.     Problem: Potential knowledge deficit related to lack of understanding of self and  care  Goal: Patient will verbalize understanding of self and infant care    Intervention: Assess patient and knowledge of self and infant care  Pt able to care for self and infant.

## 2018-12-04 NOTE — LACTATION NOTE
This note was copied from a baby's chart.  Mother reports that she is breastfeeding her  without difficulty or discomfort. Encouraged to obtain assistance from nursing if any problems with baby feeding. Mother has her breast pump at the bedside as a back-up.

## 2018-12-04 NOTE — PROGRESS NOTES
Name:   Alejandra Reyes Villegas   Date/Time:  2018 6:21 AM  Gestational Age:  37w2d  Admit Date:   2018  Admitting Dx:   Pregnancy  Labor and delivery indication for care or intervention    POD# 2 S/P rC/S    S:  Abdominal pain yes   Ambulating   yes  Tolerating PO  yes  Flatus    yes  Bleeding   Yes, decreasing in amount  Voiding   yes   Dizziness   no    O:  Pulse: 78  Blood Pressure: 100/59     Temp  Av.5 °C (97.7 °F)  Min: 36.4 °C (97.5 °F)  Max: 36.6 °C (97.9 °F)  Heart: RRR, Soft grade 2/6 systolic murmur, no rubs or gallops  Lungs: clear bases  Abdomen: flat and soft/nontender / bowelsounds present / incision clean w/ strike-through noted  Extremities: non-tender  Catheter: DC'd    Intake/Output Summary (Last 24 hours) at 18 0621  Last data filed at 18 1000   Gross per 24 hour   Intake              340 ml   Output             1225 ml   Net             -885 ml       Assessment/Plan:  29yo female admitted for delivery of a term infant via rC/S. Transferred to telemetry for ACS/PE r/o, now recovering on post-partum floor.     #Chest pain, acute onset- improved  #Shortness of breath- resolved  #Tachycardia- resolved  - No cardiac hx of hx of blood disorders  - Onset s/p csx in PACU, described as chest pressure with non radiating  - Alleviated with one time albuterol nebulizer treatment, now described as minimal  - Associated with shortness of breath and tachycardia. Otherwise normotensive  - No HA/double vision/altered mental status/speech changes/dizziness  - EKG wnl x3, Troponin negative x3  - Mg 1.7, CBC, BMP coags wnl  - CXR: negative,  CTA for PE: negative  -JP Score low (1) %5 all cause mortality, HEART score 0 , 6-week risk if major adverse cardiac events  - Echo performed and demonstrated LVEF of 60%  - TSH of 2.650 and HgbA1c of 5.9  - No stress test needed  - Nauseous this AM requiring use of Zofran     Plan:  - Cont Zofran PRN  - ADAT  - Cont to remain on post-partum floor  for recovery from rC/S, will continue to follow.      Core measures:  Code: Full  PCP: None  GI ppx: None  DVT ppx: SCDs  ABX: None  Post op pain: per OB       Dispo: Cont to remain IP for rC/S recovery. [facility] to sign-off. Thank you for allowing us the opportunity to take part in the care of this pt. Please do not hesitate to contact [facility] if you have further questions/concerns.      Ge Geronimo D.O.

## 2018-12-04 NOTE — LACTATION NOTE
This note was copied from a baby's chart.  Baby lost 9% weight at 48 hours old.  [id number] on ipad used. Baby latches and appears to be latched deep, however baby not transferring. Initiated pumping & supplementing 30 ml according to supplemental guidelines reviewed with parents, voiced understanding. Mother able to pump & hand express 30 ml, pump settings speed 80/60, suction 30% x 15 minutes, every 2-3 hours. FOB fed back pumped breast milk, baby tolerated well.   Breastfeeding plan now:  Breastfeed, supplement 30 ml using guideline volumes (Similac or pumped BM), then pump & hand express, every 2-3 hours.LC to F/U tomorrow with couplet.

## 2018-12-04 NOTE — PROGRESS NOTES
Assumed care of pt, received report from DAINA Bass. FOB at bedside bonding with baby. Mother was sleeping and denies pain at this time, requested her dinner tray be delivered early to her. Will continue to monitor.

## 2018-12-05 VITALS
SYSTOLIC BLOOD PRESSURE: 110 MMHG | TEMPERATURE: 97.8 F | RESPIRATION RATE: 18 BRPM | WEIGHT: 155 LBS | OXYGEN SATURATION: 98 % | BODY MASS INDEX: 30.43 KG/M2 | HEART RATE: 85 BPM | HEIGHT: 60 IN | DIASTOLIC BLOOD PRESSURE: 70 MMHG

## 2018-12-05 PROCEDURE — 700102 HCHG RX REV CODE 250 W/ 637 OVERRIDE(OP): Performed by: OBSTETRICS & GYNECOLOGY

## 2018-12-05 PROCEDURE — A9270 NON-COVERED ITEM OR SERVICE: HCPCS | Performed by: OBSTETRICS & GYNECOLOGY

## 2018-12-05 PROCEDURE — 700112 HCHG RX REV CODE 229: Performed by: OBSTETRICS & GYNECOLOGY

## 2018-12-05 PROCEDURE — A9270 NON-COVERED ITEM OR SERVICE: HCPCS | Performed by: STUDENT IN AN ORGANIZED HEALTH CARE EDUCATION/TRAINING PROGRAM

## 2018-12-05 PROCEDURE — 700102 HCHG RX REV CODE 250 W/ 637 OVERRIDE(OP): Performed by: STUDENT IN AN ORGANIZED HEALTH CARE EDUCATION/TRAINING PROGRAM

## 2018-12-05 RX ORDER — OXYCODONE HYDROCHLORIDE AND ACETAMINOPHEN 5; 325 MG/1; MG/1
1 TABLET ORAL EVERY 4 HOURS PRN
Qty: 18 TAB | Refills: 0 | Status: SHIPPED | OUTPATIENT
Start: 2018-12-05 | End: 2018-12-08

## 2018-12-05 RX ORDER — FERROUS GLUCONATE 324(38)MG
324 TABLET ORAL
Qty: 30 TAB | Refills: 0 | Status: SHIPPED | OUTPATIENT
Start: 2018-12-05 | End: 2019-01-04

## 2018-12-05 RX ORDER — IBUPROFEN 600 MG/1
600 TABLET ORAL EVERY 6 HOURS PRN
Qty: 28 TAB | Refills: 0 | Status: SHIPPED | OUTPATIENT
Start: 2018-12-05 | End: 2018-12-12

## 2018-12-05 RX ORDER — PSEUDOEPHEDRINE HCL 30 MG
100 TABLET ORAL 2 TIMES DAILY PRN
Qty: 28 CAP | Refills: 0 | Status: SHIPPED | OUTPATIENT
Start: 2018-12-05 | End: 2018-12-19

## 2018-12-05 RX ADMIN — OXYCODONE AND ACETAMINOPHEN 2 TABLET: 5; 325 TABLET ORAL at 05:03

## 2018-12-05 RX ADMIN — IBUPROFEN 600 MG: 600 TABLET, FILM COATED ORAL at 10:33

## 2018-12-05 RX ADMIN — SIMETHICONE CHEW TAB 80 MG 80 MG: 80 TABLET ORAL at 00:56

## 2018-12-05 RX ADMIN — DOCUSATE SODIUM 100 MG: 100 CAPSULE, LIQUID FILLED ORAL at 05:03

## 2018-12-05 RX ADMIN — IBUPROFEN 600 MG: 600 TABLET, FILM COATED ORAL at 00:56

## 2018-12-05 RX ADMIN — ASPIRIN 325 MG: 325 TABLET, COATED ORAL at 05:03

## 2018-12-05 RX ADMIN — OXYCODONE AND ACETAMINOPHEN 2 TABLET: 5; 325 TABLET ORAL at 00:56

## 2018-12-05 RX ADMIN — FERROUS GLUCONATE 324 MG: 324 TABLET ORAL at 10:33

## 2018-12-05 RX ADMIN — OXYCODONE AND ACETAMINOPHEN 2 TABLET: 5; 325 TABLET ORAL at 10:38

## 2018-12-05 ASSESSMENT — EDINBURGH POSTNATAL DEPRESSION SCALE (EPDS)
I HAVE FELT SAD OR MISERABLE: NOT VERY OFTEN
I HAVE BEEN SO UNHAPPY THAT I HAVE HAD DIFFICULTY SLEEPING: NOT AT ALL
I HAVE BEEN SO UNHAPPY THAT I HAVE BEEN CRYING: NO, NEVER
I HAVE BEEN ABLE TO LAUGH AND SEE THE FUNNY SIDE OF THINGS: AS MUCH AS I ALWAYS COULD
I HAVE BLAMED MYSELF UNNECESSARILY WHEN THINGS WENT WRONG: YES, SOME OF THE TIME
THE THOUGHT OF HARMING MYSELF HAS OCCURRED TO ME: NEVER
THINGS HAVE BEEN GETTING ON TOP OF ME: NO, MOST OF THE TIME I HAVE COPED QUITE WELL
I HAVE FELT SCARED OR PANICKY FOR NO GOOD REASON: NO, NOT MUCH
I HAVE LOOKED FORWARD WITH ENJOYMENT TO THINGS: RATHER LESS THAN I USED TO
I HAVE BEEN ANXIOUS OR WORRIED FOR NO GOOD REASON: YES, SOMETIMES

## 2018-12-05 ASSESSMENT — PAIN SCALES - GENERAL
PAINLEVEL_OUTOF10: 8
PAINLEVEL_OUTOF10: 3
PAINLEVEL_OUTOF10: 4
PAINLEVEL_OUTOF10: 7
PAINLEVEL_OUTOF10: 4
PAINLEVEL_OUTOF10: 9
PAINLEVEL_OUTOF10: 3

## 2018-12-05 NOTE — PROGRESS NOTES
Pt discharged at approximately 1230 via wheelchair with hospital escort. Infant placed in car seat by parent, and checked by RN. Per NBN RN infant okay to discharge.  Pt discharge instructions and prescriptions given to patient by Jyotsna LAMA.  Jyotsna LAMA Checked armbands, removed clamp and cuddles. Feeding plan in place, and re-educated parents about it. MOB to breastfeed, pump and supplement with similac if necessary. Copy of feeding guidelines provided. No further questions at this time.

## 2018-12-05 NOTE — DISCHARGE INSTRUCTIONS
POSTPARTUM DISCHARGE INSTRUCTIONS FOR MOM    YOB: 1990   Age: 28 y.o.               Admit Date: 2018     Discharge Date: 2018  Attending Doctor:  Ernestina Ibarra M.D.                  Allergies:  Patient has no known allergies.    Discharged to home by car. Discharged via wheelchair, hospital escort: Yes.  Special equipment needed: Not Applicable  Belongings with: Personal  Be sure to schedule a follow-up appointment with your primary care doctor or any specialists as instructed.     Discharge Plan:   Diet Plan: Discussed  Activity Level: Discussed  Confirmed Follow up Appointment: Patient to Call and Schedule Appointment  Confirmed Symptoms Management: Discussed  Medication Reconciliation Updated: Yes  Influenza Vaccine Indication: Not indicated: Previously immunized this influenza season and > 8 years of age    REASONS TO CALL YOUR OBSTETRICIAN:  1.   Persistent fever or shaking chills (Temperature higher than 100.4)  2.   Heavy bleeding (soaking more than 1 pad per hour); Passing clots  3.   Foul odor from vagina  4.   Mastitis (Breast infection; breast pain, chills, fever, redness)  5.   Urinary pain, burning or frequency  6.   Episiotomy infection  7.   Abdominal incision infection  8.   Severe depression longer than 24 hours    HAND WASHING  · Prior to handling the baby.  · Before breastfeeding or bottle feeding baby.  · After using the bathroom or changing the baby's diaper.    WOUND CARE  Ask your physician for additional care instructions.  In general:    ·  Incision:      · Keep clean and dry.    · Do NOT lift anything heavier than your baby for up to 6 weeks.    · There should not be any opening or pus.      VAGINAL CARE  · Nothing inside vagina for 6 weeks: no sexual intercourse, tampons or douching.  · Bleeding may continue for 2-4 weeks.  Amount may vary.    · Call your physician for heavy bleeding which means soaking more than 1 pad per hour    BIRTH CONTROL  · It is  "possible to become pregnant at any time after delivery and while breastfeeding.  · Plan to discuss a method of birth control with your physician at your follow up visit. visit.    DIET AND ELIMINATION  · Eating more fiber (bran cereal, fruits, and vegetables) and drinking plenty of fluids will help to avoid constipation.  · Urinary frequency after childbirth is normal.    POSTPARTUM BLUES  During the first few days after birth, you may experience a sense of the \"blues\" which may include impatience, irritability or even crying.  These feeling come and go quickly.  However, as many as 1 in 10 women experience emotional symptoms known as postpartum depression.    Postpartum depression:  May start as early as the second or third day after delivery or take several weeks or months to develop.  Symptoms of \"blues\" are present, but are more intense:  Crying spells; loss of appetite; feelings of hopelessness or loss of control; fear of touching the baby; over concern or no concern at all about the baby; little or no concern about your own appearance/caring for yourself; and/or inability to sleep or excessive sleeping.  Contact your physician if you are experiencing any of these symptoms.    Crisis Hotline:  · [place] Crisis Hotline:  6-740-OWXYFZE  Or 1-936.767.7935  · [place] Crisis Hotline:  1-210.428.4282  Or 018-980-9617    PREVENTING SHAKEN BABY:  If you are angry or stressed, PUT THE BABY IN THE CRIB, step away, take some deep breaths, and wait until you are calm to care for the baby.  DO NOT SHAKE THE BABY.  You are not alone, call a supporter for help.    · Crisis Call Center 24/7 crisis line 552-011-9903 or 1-933.969.5336  · You can also text them, text \"ANSWER\" to 528451    QUIT SMOKING/TOBACCO USE:  I understand the use of any tobacco products increases my chance of suffering from future heart disease and could cause other illnesses which may shorten my life. Quitting the use of tobacco products is the single most " important thing I can do to improve my health. For further information on smoking / tobacco cessation call a Toll Free Quit Line at 1-506.492.4702 (*National Cancer [place]) or 1-744.679.7938 (American Lung Association) or you can access the web based program at www.lungusa.org.    · [place] Tobacco Users Help Line:  (418) 408-2257       Toll Free: 1-105.210.4695  · Quit Tobacco Program [facility] (415)041-7927    DEPRESSION / SUICIDE RISK:  As you are discharged from this [facility], it is important to learn how to keep safe from harming yourself.    Recognize the warning signs:  · Abrupt changes in personality, positive or negative- including increase in energy   · Giving away possessions  · Change in eating patterns- significant weight changes-  positive or negative  · Change in sleeping patterns- unable to sleep or sleeping all the time   · Unwillingness or inability to communicate  · Depression  · Unusual sadness, discouragement and loneliness  · Talk of wanting to die  · Neglect of personal appearance   · Rebelliousness- reckless behavior  · Withdrawal from people/activities they love  · Confusion- inability to concentrate     If you or a loved one observes any of these behaviors or has concerns about self-harm, here's what you can do:  · Talk about it- your feelings and reasons for harming yourself  · Remove any means that you might use to hurt yourself (examples: pills, rope, extension cords, firearm)  · Get professional help from the community (Mental Health, Substance Abuse, psychological counseling)  · Do not be alone:Call your Safe Contact- someone whom you trust who will be there for you.  · Call your local CRISIS HOTLINE 001-9275 or 149-920-0090  · Call your local Children's Mobile Crisis Response Team [place] (930) 594-0019 or www.[employer]  · Call the toll free National Suicide Prevention Hotlines   · National Suicide Prevention Lifeline 737-499-UOHQ  (6078)  · National [facility] 800-SUICIDE (276-7953)    DISCHARGE SURVEY:  Thank you for choosing [facility].  We hope we provided you with very good care.  You may be receiving a survey in the mail.  Please fill it out.  Your opinion is valuable to us.    ADDITIONAL EDUCATIONAL MATERIALS GIVEN TO PATIENT:      DISCHARGE INSTRUCTIONS (Czech) POSTPARTUM FOR MOM      ASAD LAS SANDEEP:  · Antes de tocar el bebé.  · Antes del amamantamiento o darle al bebé lactancia artificial.  · Después de usar el baño.    CUIDADO DE LAS HERIDAS:  · La Incisión de la Operación Cesárea:  Mantenga limpea y seca, NO levante nada que pesa más que piedra bebé por 6 semenas.  No debe ninguna apertura ni pus.  · Episiotomía/Wendy Vaginal:  Use un spray de Tucks o Dermoplast, tome un baño de asiento cuando necesite (6 pulgadas), siga usando la botella Mai hasta que pare de sangrar.    CUIDADA DEL SENO:  · Lleve un sostén.  · Si esta` amamantando no use jabón en el seno ni en los pezones.  · Aplique lanolina si los pezones se ponen quebrazados y si le duelen.    CUIDADO DE LA VAGINA:  · [place] puede entrar la vagina por 6 semanas:  Actividad sexual, Ducha vaginal, Tampones.  · El sangramiento puede seguir por 2 a 4 semanas.  La cantidad es variable.  Llame a piedra med`ico(a) obstetra si hay mucha manuel (si usa ma`s de chaya toalla femenina cada hora).    CONTRACEPCIÒN:  · Es posible embarazarse en cualquier tiempo despus del parto y mientras el amamantamiento.  · Debe planear de discutir los metodos de cantracepción con piedra médico(a) cuando vuelva en 6 semanas para piedra revisión médica.    DIETA Y DEFECACÒN:  · Para evitar la constipación coma mas fibra (cereal salvado, fruta, y verduras) Y tome muchos liquidos.   · Es común orinar frecuentemente después del parto.    POSTPARTO Y LA DEPRESÒN:  Santo los primeros bustamante después del parto es común sentirse:  · Impaciente, irritable, o llorar  Estos sentimientos llegan y van rapidamente.  No  obstante, sole chaya de cada hedy mujeres tiene síntomas emocionales conocidos ann depresión postparto.  · Despresión Postparto:  Puede empezar tan pronto ann el fernando o tercer día después del parto o puede durar algunas semanas o meses para desarrollar.  Síntomas de la depresión pueden presentarse, jenna son más intensos:  · Pérdida del hambre  · Frecuencia de llorar  · Sentirse desesperada o perder el control  · Demasiada o no suficiente preocupación con piedra bebé  · Tener miedo de tocar el bebé  · No preocuparse con piedra propia apariencia  · Incapacidad de dormir o dormir excesivamente    DEPRESIÒN / RIESGO AL SUICIDIO:    Cuando se le da de paramjit de alguna entidad de Replaced by [facility], es importante aprender a mantener a ro de hacerse daño a sí mismo.    Reconocer los signos de advertencia:  · Cambios bruscos en la peronalidad, positiva o negativa, incluyendo aumento de la energia  · Regalar posesiones  · Cambios en patrones de comer - significativos cambios de peso - positivos o negativos  · Cambio de patrones para dormir - no poder dormir o dormir todo el tiempo  · Falta de voluntad o incapacidad de comunicarse  · Depresión  · Sugar, desánimo y tristeza inusual  · Habla de querer morir  · Descuido del aspecto personal  · Rebeldía - comportamiento imprudente  · Retiro de personas y actividades que les gusta  · Confusión-incapacidad para concentrarse    Si usted o un ser querido observa cualquiera de estos comportamientos o tiene preocupaciones de hacerse daño a si mismo, aquí le damos lo que usted puede hacer:  · Hablar de ti - tus sentimientos y razones para hacerse yvon a si mismo  · Retire cualquier medio que se podría utilizar para lastimarse (ejemplos: píldoras, cuerdas, cordones de extensión, arma de kajal)  · Obtenga ayuda profesional de la comunidad (helga mental, abuso de sustancias, orientación psicológica)  · No estar solo: llamar a un contacto seguro - chaya persona que confía en que estará allí por  usted  · Llamada local L´INEA de CRISIS [phone] y [phone]  · Llamada local [employer] Para Crisis de [place] de [place] (501) 310-9240 or www.Immunetics.com  · Llamada gratuita nacional, líneas de prevención del suicidio  · Preventión del Suicidio Nacional [facility] 915-410-DQLB (3194)  · Línea Nacional de la [employer] [place] 800-SUICIDE (979-7656)       ([employer])            My signature on this form indicates that:  1.  I have reviewed and understand the above information  2.  My questions regarding this information have been answered to my satisfaction.  3.  I have formulated a plan with my discharge nurse to obtain my prescribed medication for home.

## 2018-12-05 NOTE — PROGRESS NOTES
Assessment complete. Fundus firm, lochia light. VSS. Tolerating diet. Voiding without difficulty. Pt states passing gas. Pain controlled with prn medications per mar. Will offer pain medications as they become available. FOB at bedside. POC discussed with pt., discussed pumping plans and encouraged to call with needs. Call light in place.

## 2018-12-05 NOTE — PROGRESS NOTES
1050-Discharge education discussed with patient.  Patient verbalized understanding.  Prescriptions handed to patient.  Patient verbalized understanding of follow up appointments for herself and infant.

## 2018-12-05 NOTE — CARE PLAN
Problem: Pain Management  Goal: Pain level will decrease to patient's comfort goal  Outcome: PROGRESSING AS EXPECTED  PRN medication administered per MAR.    Problem: Potential for postpartum infection related to surgical incision, compromised uterine condition, urinary tract or respiratory compromise  Goal: Patient will be afebrile and free from signs and symptoms of infection  Outcome: PROGRESSING AS EXPECTED  No current s/s of infection

## 2018-12-05 NOTE — PROGRESS NOTES
0701--Received report from night RN. Infant at bedside in open crib no signs of distress. Pt resting in bed, discussed plan of care and pain management for the day in Maori. Pt states she will call staff if she requires pain interventions or assistance through the day. PRN medication administered per MAR. No further needs at this time.

## 2018-12-05 NOTE — DISCHARGE SUMMARY
Discharge Summary:     Alejandra Reyes Villegas    Admit Date:  2018    Admitting diagnosis: prior  .Not in labor.    Discharge Date:  2018    Discharge Diagnosis:  Prior  .repeat  section, low transverse incision  Chest Pain, Acute  Shortness of Breath    Past Medical History:   Diagnosis Date   • Psychiatric problem     depression     OB History    Para Term  AB Living   2 2 2     2   SAB TAB Ectopic Molar Multiple Live Births           0 2      # Outcome Date GA Lbr Chris/2nd Weight Sex Delivery Anes PTL Lv   2 Term 18 37w2d  2.715 kg (5 lb 15.8 oz) M CS-LTranv Spinal N MEGGAN   1 Term      CS-Unspec   MEGGAN          Patient has no known allergies.  There are no active problems to display for this patient.      Hospital Course:   28 y.o. G2, now para2, was admitted with the above mentioned diagnosis, underwent  for repeat. Patient postpartum course remarkable for chest pain and shortness of breath. Pt was transferred to the ICU in the [facility] and underwent CT of the chest as there was concern for possible pulmonary embolism. CT was negative and the pt's chest pain resolved. Echocardiogram performed and pt showing a LVEF of ~60%. Murmur auscultated on exam, however was improving during course of stay. Pt transferred back to post-partum and had no slow progression in advancement of diet. Pt had no problems with ambulation and toleration of oral analgesia. Patient without complaints today and desires discharge.     Vitals:    18 1600 18 2000 18 0800 18   BP: 101/58 100/59 106/72 105/70   Pulse: 81 78 86 78   Resp: 19 18 16 17   Temp: 36.4 °C (97.6 °F) 36.6 °C (97.9 °F) 36.6 °C (97.8 °F) 36.9 °C (98.5 °F)   TempSrc: Temporal Oral Temporal Temporal   SpO2: 96% 100% 94% 93%   Weight:       Height:         Exam:  Abdomen:  negative   plans to breastfeed  Abdomen soft, appropriately tender. BS normal. No masses or organomegaly     Incision: no erythema, appropriately tender, well approximated, steri-strips applied  Fundus Firm .yes, Tender .yes, Below Umbilicus.yes  perineum intact  no cyanosis, clubbing  Course complicated by acute chest pain and concern for pulmonary embolism, negative work-up.  Taking adequate diet and fluids, ambulating well, breastfeeding, passing gas.    Labs:    Lab Results   Component Value Date/Time    WBC 11.7 (H) 12/03/2018 02:31 AM    RBC 3.45 (L) 12/03/2018 02:31 AM    HEMOGLOBIN 10.7 (L) 12/03/2018 02:31 AM    HEMATOCRIT 31.3 (L) 12/03/2018 02:31 AM    MCV 90.7 12/03/2018 02:31 AM    MCH 31.0 12/03/2018 02:31 AM    MCHC 34.2 12/03/2018 02:31 AM    MPV 10.9 12/03/2018 02:31 AM        Activity:   Discharge to home  Pelvic Rest x 6 weeks  Follow up: CNMs in 5 weeks for vaginal exam; 1 week for incision check.       Discharge Meds:   Percocet 5/325 mg , as directed  Motrin 800mg , as directed  Colace 100 mg BID  FeS04 325 mg as directed      Ge Geronimo DO, MPH (PGY-2)

## 2019-04-21 ENCOUNTER — HOSPITAL ENCOUNTER (EMERGENCY)
Facility: MEDICAL CENTER | Age: 29
End: 2019-04-21
Attending: EMERGENCY MEDICINE
Payer: COMMERCIAL

## 2019-04-21 VITALS
DIASTOLIC BLOOD PRESSURE: 79 MMHG | SYSTOLIC BLOOD PRESSURE: 108 MMHG | HEART RATE: 55 BPM | TEMPERATURE: 97.8 F | BODY MASS INDEX: 25.4 KG/M2 | RESPIRATION RATE: 16 BRPM | WEIGHT: 138.01 LBS | OXYGEN SATURATION: 95 % | HEIGHT: 62 IN

## 2019-04-21 DIAGNOSIS — R07.9 CHEST PAIN, UNSPECIFIED TYPE: ICD-10-CM

## 2019-04-21 LAB
ALBUMIN SERPL BCP-MCNC: 4.4 G/DL (ref 3.2–4.9)
ALBUMIN/GLOB SERPL: 1.5 G/DL
ALP SERPL-CCNC: 112 U/L (ref 30–99)
ALT SERPL-CCNC: 11 U/L (ref 2–50)
ANION GAP SERPL CALC-SCNC: 10 MMOL/L (ref 0–11.9)
AST SERPL-CCNC: 12 U/L (ref 12–45)
BASOPHILS # BLD AUTO: 0.6 % (ref 0–1.8)
BASOPHILS # BLD: 0.05 K/UL (ref 0–0.12)
BILIRUB SERPL-MCNC: 0.6 MG/DL (ref 0.1–1.5)
BUN SERPL-MCNC: 11 MG/DL (ref 8–22)
CALCIUM SERPL-MCNC: 9.2 MG/DL (ref 8.5–10.5)
CHLORIDE SERPL-SCNC: 107 MMOL/L (ref 96–112)
CO2 SERPL-SCNC: 21 MMOL/L (ref 20–33)
CREAT SERPL-MCNC: 0.66 MG/DL (ref 0.5–1.4)
EKG IMPRESSION: NORMAL
EOSINOPHIL # BLD AUTO: 0.16 K/UL (ref 0–0.51)
EOSINOPHIL NFR BLD: 1.9 % (ref 0–6.9)
ERYTHROCYTE [DISTWIDTH] IN BLOOD BY AUTOMATED COUNT: 42.1 FL (ref 35.9–50)
GLOBULIN SER CALC-MCNC: 3 G/DL (ref 1.9–3.5)
GLUCOSE SERPL-MCNC: 93 MG/DL (ref 65–99)
HCT VFR BLD AUTO: 43.1 % (ref 37–47)
HGB BLD-MCNC: 14.1 G/DL (ref 12–16)
IMM GRANULOCYTES # BLD AUTO: 0.03 K/UL (ref 0–0.11)
IMM GRANULOCYTES NFR BLD AUTO: 0.4 % (ref 0–0.9)
LIPASE SERPL-CCNC: 18 U/L (ref 11–82)
LYMPHOCYTES # BLD AUTO: 2.73 K/UL (ref 1–4.8)
LYMPHOCYTES NFR BLD: 32.6 % (ref 22–41)
MCH RBC QN AUTO: 28.3 PG (ref 27–33)
MCHC RBC AUTO-ENTMCNC: 32.7 G/DL (ref 33.6–35)
MCV RBC AUTO: 86.5 FL (ref 81.4–97.8)
MONOCYTES # BLD AUTO: 0.5 K/UL (ref 0–0.85)
MONOCYTES NFR BLD AUTO: 6 % (ref 0–13.4)
NEUTROPHILS # BLD AUTO: 4.9 K/UL (ref 2–7.15)
NEUTROPHILS NFR BLD: 58.5 % (ref 44–72)
NRBC # BLD AUTO: 0 K/UL
NRBC BLD-RTO: 0 /100 WBC
PLATELET # BLD AUTO: 291 K/UL (ref 164–446)
PMV BLD AUTO: 10.2 FL (ref 9–12.9)
POTASSIUM SERPL-SCNC: 4 MMOL/L (ref 3.6–5.5)
PROT SERPL-MCNC: 7.4 G/DL (ref 6–8.2)
RBC # BLD AUTO: 4.98 M/UL (ref 4.2–5.4)
SODIUM SERPL-SCNC: 138 MMOL/L (ref 135–145)
TROPONIN I SERPL-MCNC: <0.02 NG/ML (ref 0–0.04)
WBC # BLD AUTO: 8.4 K/UL (ref 4.8–10.8)

## 2019-04-21 PROCEDURE — 85025 COMPLETE CBC W/AUTO DIFF WBC: CPT

## 2019-04-21 PROCEDURE — 93005 ELECTROCARDIOGRAM TRACING: CPT

## 2019-04-21 PROCEDURE — 99284 EMERGENCY DEPT VISIT MOD MDM: CPT

## 2019-04-21 PROCEDURE — 700102 HCHG RX REV CODE 250 W/ 637 OVERRIDE(OP): Performed by: EMERGENCY MEDICINE

## 2019-04-21 PROCEDURE — A9270 NON-COVERED ITEM OR SERVICE: HCPCS | Performed by: EMERGENCY MEDICINE

## 2019-04-21 PROCEDURE — 80053 COMPREHEN METABOLIC PANEL: CPT

## 2019-04-21 PROCEDURE — 93005 ELECTROCARDIOGRAM TRACING: CPT | Performed by: EMERGENCY MEDICINE

## 2019-04-21 PROCEDURE — 84484 ASSAY OF TROPONIN QUANT: CPT

## 2019-04-21 PROCEDURE — 83690 ASSAY OF LIPASE: CPT

## 2019-04-21 RX ORDER — DICLOFENAC SODIUM 25 MG/1
25 TABLET, DELAYED RELEASE ORAL 2 TIMES DAILY
COMMUNITY
End: 2021-03-01

## 2019-04-21 RX ADMIN — LIDOCAINE HYDROCHLORIDE 30 ML: 20 SOLUTION OROPHARYNGEAL at 14:45

## 2019-04-21 NOTE — ED PROVIDER NOTES
ED Provider Note    CHIEF COMPLAINT  Chief Complaint   Patient presents with   • Chest Pain   • Numbness   • Fatigue       HPI  Alejandra Reyes Villegas is a 28 y.o. female who presents with chest pain.  She has had intermittently for quite some time.  She has had it this week.  Today she woke up with it at the left upper parasternal chest pain going to the shoulder and down the left nothing makes it any better or worse.  She is felt weak and looked pale today's had intermittent bruising.  It seems to be worse at night.  Is been constant since she woke up this morning for over 6 hours.  She has some low back pain no neck pain some shortness of breath no cough runny nose sore throat some nausea no vomiting.  Speaks Mongolian only history is taken through her .  Her past medical history is significant for anemia she is not on prescriptions no estrogens.  All other systems are negative    REVIEW OF SYSTEMS  See HPI for further details    PAST MEDICAL HISTORY  Past Medical History:   Diagnosis Date   • Psychiatric problem     depression       FAMILY HISTORY  History reviewed. No pertinent family history.    SOCIAL HISTORY  Social History     Social History   • Marital status:      Spouse name: N/A   • Number of children: N/A   • Years of education: N/A     Social History Main Topics   • Smoking status: Never Smoker   • Smokeless tobacco: Never Used   • Alcohol use No   • Drug use: No   • Sexual activity: Not on file     Other Topics Concern   • Not on file     Social History Narrative   • No narrative on file       SURGICAL HISTORY  Past Surgical History:   Procedure Laterality Date   • REPEAT C SECTION  12/2/2018    Procedure: REPEAT C SECTION;  Surgeon: Jenna Toscano M.D.;  Location: LABOR AND DELIVERY;  Service: Labor and Delivery   • GYN SURGERY      C/S       CURRENT MEDICATIONS  Home Medications     Reviewed by Moses Machuca R.N. (Registered Nurse) on 04/21/19 at 2301  Med List  "Status: Not Addressed   Medication Last Dose Status   diclofenac EC (VOLTAREN) 25 MG Tablet Delayed Response prn Active                ALLERGIES  No Known Allergies    PHYSICAL EXAM  VITAL SIGNS: /79   Pulse (!) 55   Temp 36.6 °C (97.8 °F) (Temporal)   Resp 16   Ht 1.575 m (5' 2\")   Wt 62.6 kg (138 lb 0.1 oz)   SpO2 95%   BMI 25.24 kg/m²     Constitutional: Patient is alert  in no distress   HENT: Moist mucous membranes  Eyes:   No conjunctivitis or icterus  Neck: Trach is midline no palpable thyroid  Lymphatic: Negative anterior cervical lymphadenopathy  Cardiovascular: Normal heart rate    Thorax & Lungs: Clear to auscultation chest wall nontender  Back: No CVA tenderness  Abdomen: Abdomen soft and nontender  Neurologic: Normal motor sensation  Extremities: No edema  Psychiatric: Affect normal, Judgment normal, Mood normal.       Results for orders placed or performed during the hospital encounter of 04/21/19   CBC WITH DIFFERENTIAL   Result Value Ref Range    WBC 8.4 4.8 - 10.8 K/uL    RBC 4.98 4.20 - 5.40 M/uL    Hemoglobin 14.1 12.0 - 16.0 g/dL    Hematocrit 43.1 37.0 - 47.0 %    MCV 86.5 81.4 - 97.8 fL    MCH 28.3 27.0 - 33.0 pg    MCHC 32.7 (L) 33.6 - 35.0 g/dL    RDW 42.1 35.9 - 50.0 fL    Platelet Count 291 164 - 446 K/uL    MPV 10.2 9.0 - 12.9 fL    Neutrophils-Polys 58.50 44.00 - 72.00 %    Lymphocytes 32.60 22.00 - 41.00 %    Monocytes 6.00 0.00 - 13.40 %    Eosinophils 1.90 0.00 - 6.90 %    Basophils 0.60 0.00 - 1.80 %    Immature Granulocytes 0.40 0.00 - 0.90 %    Nucleated RBC 0.00 /100 WBC    Neutrophils (Absolute) 4.90 2.00 - 7.15 K/uL    Lymphs (Absolute) 2.73 1.00 - 4.80 K/uL    Monos (Absolute) 0.50 0.00 - 0.85 K/uL    Eos (Absolute) 0.16 0.00 - 0.51 K/uL    Baso (Absolute) 0.05 0.00 - 0.12 K/uL    Immature Granulocytes (abs) 0.03 0.00 - 0.11 K/uL    NRBC (Absolute) 0.00 K/uL   COMP METABOLIC PANEL   Result Value Ref Range    Sodium 138 135 - 145 mmol/L    Potassium 4.0 3.6 - 5.5 " mmol/L    Chloride 107 96 - 112 mmol/L    Co2 21 20 - 33 mmol/L    Anion Gap 10.0 0.0 - 11.9    Glucose 93 65 - 99 mg/dL    Bun 11 8 - 22 mg/dL    Creatinine 0.66 0.50 - 1.40 mg/dL    Calcium 9.2 8.5 - 10.5 mg/dL    AST(SGOT) 12 12 - 45 U/L    ALT(SGPT) 11 2 - 50 U/L    Alkaline Phosphatase 112 (H) 30 - 99 U/L    Total Bilirubin 0.6 0.1 - 1.5 mg/dL    Albumin 4.4 3.2 - 4.9 g/dL    Total Protein 7.4 6.0 - 8.2 g/dL    Globulin 3.0 1.9 - 3.5 g/dL    A-G Ratio 1.5 g/dL   LIPASE   Result Value Ref Range    Lipase 18 11 - 82 U/L   ESTIMATED GFR   Result Value Ref Range    GFR If African American >60 >60 mL/min/1.73 m 2    GFR If Non African American >60 >60 mL/min/1.73 m 2   EKG   Result Value Ref Range    Report       [facility] Emergency Dept.    Test Date:  2019  Pt Name:    ALEJANDRA REYES VILLEGAS     Department: ER  MRN:        [id number]                      Room:        13  Gender:     Female                       Technician: [id number]  :        1990                   Requested By:ER TRIAGE PROTOCOL  Order #:    [id number]                    Reading MD: DINH LINTON MD    Measurements  Intervals                                Axis  Rate:       64                           P:          26  OK:         148                          QRS:        69  QRSD:       88                           T:          18  QT:         412  QTc:        425    Interpretive Statements  normal sinus rhythm rate of 64 with a normal corrected QT interval QRS and  axis  nonspecific ST changes otherwise normal EKG no old EKG for comparison    Electronically Signed On 2019 14:29:09 PDT by DINH LINTNO MD          COURSE & MEDICAL DECISION MAKING  Pertinent Labs & Imaging studies reviewed. (See chart for details)  Patient has an atypical chest pain.  By PERC criteria with low probability of PE no further evaluation PE is being done.  I am assessing troponin EKG is essentially normal.  I will give her GI  cocktail and reassess    Patient has an atypical chest pain.  She also has other chronic symptoms such as dizziness and some yellow spots it turned out to be bruises.  She has 2 on the anterior chest one on the arm.  Her labs are normal she has normal complete metabolic panel CBC white count including platelet count are normal.  Troponin not on the chart today due to delay it a second hospital is nondetectable EKG is unremarkable.    I do not think the patient needs further evaluation she has had constant pain today with a negative troponin young and no risk factors she does not need cardiac follow-up.  I have given her referral follow-up and return precautions    FINAL IMPRESSION  1.   1. Chest pain, unspecified type        2.   3.         Electronically signed by: Jayesh Johnson, 4/21/2019 2:29 PM

## 2019-04-21 NOTE — ED TRIAGE NOTES
Pt ambulatory to triage. Pt states she had a baby 4 months ago and since then she's had CP, L arm numbness, lethargy, paleness. CP is described as pressue and only happens at night.  strength is equal and strong.     Chief Complaint   Patient presents with   • Chest Pain   • Numbness   • Fatigue     Pt placed in lobby, updated on triage process. Pt educated to notified RN or triage tech if changes in condition.

## 2019-04-22 NOTE — ED NOTES
1820  Go into cordell's room and she and her family have apparently left before having discharge instructions.

## 2020-11-18 ENCOUNTER — HOSPITAL ENCOUNTER (OUTPATIENT)
Dept: CARDIOLOGY | Facility: MEDICAL CENTER | Age: 30
End: 2020-11-18
Attending: OBSTETRICS & GYNECOLOGY
Payer: COMMERCIAL

## 2020-11-18 LAB — EKG IMPRESSION: NORMAL

## 2020-11-18 PROCEDURE — 93005 ELECTROCARDIOGRAM TRACING: CPT | Performed by: OBSTETRICS & GYNECOLOGY

## 2020-11-18 PROCEDURE — 93010 ELECTROCARDIOGRAM REPORT: CPT | Performed by: INTERNAL MEDICINE

## 2020-11-24 LAB
ABO GROUP BLD: NORMAL
HBV SURFACE AG SERPL QL IA: NEGATIVE
HIV 1+2 AB+HIV1 P24 AG SERPL QL IA: NON REACTIVE
RH BLD: NORMAL
RUBV IGG SERPL IA-ACNC: NORMAL
TREPONEMA PALLIDUM IGG+IGM AB [PRESENCE] IN SERUM OR PLASMA BY IMMUNOASSAY: NORMAL

## 2020-11-25 NOTE — LACTATION NOTE
"Evaluated breast pump use to include frequency, duration, suction and speed settings.Discussion with patients , Carlos Manuel, to ensure they understand the importance of breast and nipple stimulation to ensure establishment of milk. Their concern is that they have not been able to \"produce\" milk, I reassured them that she is helping the process and hand expression may help her to \"see\" more milk at this time. Plan is to follow this patient during her hospital stay.  " Per Jean Claude Reyes in lab, [facility] is unable to run The TB tests that were ordered on him today and is closed tomorrow.  Lab said that it needs to be reordered for Friday on his new account so it can be redrawn then and sent out to [facility]. Dr. Jose Esters notified via [employer]

## 2020-12-17 ENCOUNTER — HOSPITAL ENCOUNTER (EMERGENCY)
Facility: MEDICAL CENTER | Age: 30
End: 2020-12-17
Attending: OBSTETRICS & GYNECOLOGY | Admitting: OBSTETRICS & GYNECOLOGY
Payer: COMMERCIAL

## 2020-12-17 VITALS
OXYGEN SATURATION: 98 % | HEART RATE: 102 BPM | SYSTOLIC BLOOD PRESSURE: 122 MMHG | TEMPERATURE: 97.8 F | DIASTOLIC BLOOD PRESSURE: 69 MMHG | RESPIRATION RATE: 17 BRPM

## 2020-12-17 LAB
APPEARANCE UR: CLEAR
COLOR UR AUTO: YELLOW
GLUCOSE UR QL STRIP.AUTO: NEGATIVE MG/DL
KETONES UR QL STRIP.AUTO: NEGATIVE MG/DL
LEUKOCYTE ESTERASE UR QL STRIP.AUTO: NEGATIVE
NITRITE UR QL STRIP.AUTO: NEGATIVE
PH UR STRIP.AUTO: 7 [PH] (ref 5–8)
PROT UR QL STRIP: NEGATIVE MG/DL
RBC UR QL AUTO: NEGATIVE
SP GR UR STRIP.AUTO: 1.01 (ref 1–1.03)

## 2020-12-17 PROCEDURE — 81002 URINALYSIS NONAUTO W/O SCOPE: CPT

## 2020-12-17 PROCEDURE — 302449 STATCHG TRIAGE ONLY (STATISTIC)

## 2020-12-18 NOTE — PROGRESS NOTES
"  ANDREW 4/3 GA 24-5    -Pt presents to L&D c/o VB and lower abdominal pain. Pt reports some \"pink spotting\" when wiping after using the restroom. Denies LOF and confirms +FM. TOCO and EFM applied. VS taken. POC discussed.  used. Urine sample obtained. No active bleeding visualized upon assessment  -Phoned Dr. Kelly, updated on pt arrival/complaint/status, reviewed FHT's and UA results, orders to discharge home at this time with instructions for pelvic rest and to follow up in clinic   -Pt discharged home, ambulatory and undelivered. Provided general instructions and PTL precautions, understanding verbalized.  services used for discharge instructions.  "

## 2020-12-30 ENCOUNTER — APPOINTMENT (OUTPATIENT)
Dept: CARDIOLOGY | Facility: MEDICAL CENTER | Age: 30
End: 2020-12-30
Payer: COMMERCIAL

## 2020-12-30 NOTE — PROGRESS NOTES
Subjective:   Chief Complaint: No chief complaint on file.      Alejandra Reyes Villegas is a 30 y.o. female who is referred by Dr. Meme Kelly for chest pain during pregnancy.    Patient is Vietnamese-speaking,  used.          DATA REVIEWED by me:  ECG (my personal interpretation)    Echo    Most recent labs:       Lab Results   Component Value Date/Time    WBC 8.4 04/21/2019 02:40 PM    HEMOGLOBIN 14.1 04/21/2019 02:40 PM    HEMATOCRIT 43.1 04/21/2019 02:40 PM    MCV 86.5 04/21/2019 02:40 PM    INR 1.02 12/02/2018 03:18 PM      Lab Results   Component Value Date/Time    SODIUM 138 04/21/2019 02:40 PM    POTASSIUM 4.0 04/21/2019 02:40 PM    CHLORIDE 107 04/21/2019 02:40 PM    CO2 21 04/21/2019 02:40 PM    GLUCOSE 93 04/21/2019 02:40 PM    BUN 11 04/21/2019 02:40 PM    CREATININE 0.66 04/21/2019 02:40 PM      Lab Results   Component Value Date/Time    ASTSGOT 12 04/21/2019 02:40 PM    ALTSGPT 11 04/21/2019 02:40 PM    ALBUMIN 4.4 04/21/2019 02:40 PM      Lab Results   Component Value Date/Time    CHOLSTRLTOT 185 12/03/2018 07:57 AM    LDL 96 12/03/2018 07:57 AM    HDL 44 12/03/2018 07:57 AM    TRIGLYCERIDE 227 (H) 12/03/2018 07:57 AM     No results for input(s): NTPROBNP, TROPONINT in the last 72 hours.      Past Medical History:   Diagnosis Date   • Psychiatric problem     depression     Past Surgical History:   Procedure Laterality Date   • REPEAT C SECTION  12/2/2018    Procedure: REPEAT C SECTION;  Surgeon: Jenna Toscano M.D.;  Location: LABOR AND DELIVERY;  Service: Labor and Delivery   • GYN SURGERY      C/S     No family history on file.  Social History     Socioeconomic History   • Marital status:      Spouse name: Not on file   • Number of children: Not on file   • Years of education: Not on file   • Highest education level: Not on file   Occupational History   • Not on file   Social Needs   • Financial resource strain: Not on file   • Food insecurity     Worry: Not on  file     Inability: Not on file   • Transportation needs     Medical: Not on file     Non-medical: Not on file   Tobacco Use   • Smoking status: Never Smoker   • Smokeless tobacco: Never Used   Substance and Sexual Activity   • Alcohol use: No   • Drug use: No   • Sexual activity: Not on file   Lifestyle   • Physical activity     Days per week: Not on file     Minutes per session: Not on file   • Stress: Not on file   Relationships   • Social connections     Talks on phone: Not on file     Gets together: Not on file     Attends Zoroastrian service: Not on file     Active member of club or organization: Not on file     Attends meetings of clubs or organizations: Not on file     Relationship status: Not on file   • Intimate partner violence     Fear of current or ex partner: Not on file     Emotionally abused: Not on file     Physically abused: Not on file     Forced sexual activity: Not on file   Other Topics Concern   • Not on file   Social History Narrative   • Not on file     No Known Allergies    Current Outpatient Medications   Medication Sig Dispense Refill   • diclofenac EC (VOLTAREN) 25 MG Tablet Delayed Response Take 25 mg by mouth 2 times a day.       No current facility-administered medications for this visit.        ROS  All others systems reviewed and negative.     Objective:     There were no vitals taken for this visit. There is no height or weight on file to calculate BMI.    General: No acute distress. Well nourished.  HEENT: EOM grossly intact, no scleral icterus, no pharyngeal erythema.   Neck:  No JVD, no bruits, trachea midline  CVS: RRR. Normal S1, S2. No M/R/G. No LE edema.  2+ radial pulses, 2+ PT pulses  Resp: CTAB. No wheezing or crackles/rhonchi. Normal respiratory effort.  Abdomen: Soft, NT, no anant hepatomegaly.  MSK/Ext: No clubbing or cyanosis.  Skin: Warm and dry, no rashes.  Neurological: CN III-XII grossly intact. No focal deficits.   Psych: A&O x 3, appropriate affect, good  judgement        Assessment:     1. Other chest pain         Medical Decision Making:  Today's Assessment / Status / Plan:               Written instructions given today:        No follow-ups on file.    It is my pleasure to participate in the care of Ms. Reyes Villegas.  Please do not hesitate to contact me with questions or concerns.    Marisabel Harris MD, [facility]  Cardiologist [facility]    Please note that this dictation was created using voice recognition software. I have made every reasonable attempt to correct obvious errors, but it is possible there are errors of grammar and possibly content that I did not discover before finalizing the note.

## 2021-01-04 ENCOUNTER — OFFICE VISIT (OUTPATIENT)
Dept: CARDIOLOGY | Facility: MEDICAL CENTER | Age: 31
End: 2021-01-04
Payer: COMMERCIAL

## 2021-01-04 VITALS
OXYGEN SATURATION: 98 % | HEIGHT: 62 IN | SYSTOLIC BLOOD PRESSURE: 106 MMHG | DIASTOLIC BLOOD PRESSURE: 76 MMHG | HEART RATE: 98 BPM | BODY MASS INDEX: 27.05 KG/M2 | WEIGHT: 147 LBS

## 2021-01-04 DIAGNOSIS — R00.2 PALPITATIONS: ICD-10-CM

## 2021-01-04 PROBLEM — Z32.01 PREGNANCY EXAMINATION OR TEST, POSITIVE RESULT: Status: ACTIVE | Noted: 2018-08-28

## 2021-01-04 PROBLEM — Z3A.26 26 WEEKS GESTATION OF PREGNANCY: Status: ACTIVE | Noted: 2020-12-29

## 2021-01-04 PROBLEM — Z64.1 MULTIGRAVIDA: Status: ACTIVE | Noted: 2020-12-02

## 2021-01-04 PROBLEM — G43.909 MIGRAINES: Status: ACTIVE | Noted: 2018-10-03

## 2021-01-04 PROBLEM — Z36.0 ENCOUNTER FOR ANTENATAL SCREENING FOR CHROMOSOMAL ANOMALIES: Status: ACTIVE | Noted: 2020-12-07

## 2021-01-04 PROBLEM — O34.219 MATERNAL CARE FOR UNSPECIFIED TYPE SCAR FROM PREVIOUS CESAREAN DELIVERY: Status: ACTIVE | Noted: 2020-09-08

## 2021-01-04 PROBLEM — Z48.816 ENCOUNTER FOR SURGICAL AFTERCARE FOLLOWING SURGERY ON THE GENITOURINARY SYSTEM: Status: ACTIVE | Noted: 2018-12-12

## 2021-01-04 PROBLEM — Z12.4 SCREENING FOR MALIGNANT NEOPLASM OF CERVIX: Status: ACTIVE | Noted: 2019-02-11

## 2021-01-04 PROBLEM — Z98.891 H/O: CESAREAN SECTION: Status: ACTIVE | Noted: 2018-11-14

## 2021-01-04 PROCEDURE — 99203 OFFICE O/P NEW LOW 30 MIN: CPT | Performed by: STUDENT IN AN ORGANIZED HEALTH CARE EDUCATION/TRAINING PROGRAM

## 2021-01-04 ASSESSMENT — ENCOUNTER SYMPTOMS
PND: 0
DYSPNEA ON EXERTION: 0
WHEEZING: 0
WEAKNESS: 0
ABDOMINAL PAIN: 0
PALPITATIONS: 1
ORTHOPNEA: 0
NIGHT SWEATS: 0
VOMITING: 0
DIARRHEA: 0
NAUSEA: 0
SHORTNESS OF BREATH: 0
IRREGULAR HEARTBEAT: 0
NEAR-SYNCOPE: 0
DIZZINESS: 0
FOCAL WEAKNESS: 0
SYNCOPE: 0
FEVER: 0
COUGH: 0

## 2021-01-04 ASSESSMENT — FIBROSIS 4 INDEX: FIB4 SCORE: 0.36

## 2021-01-04 NOTE — PROGRESS NOTES
Cardiology Initial Consultation Note    Date of note:    1/4/2021    Primary Care Provider: Pcp Pt States None  Referring Provider: No ref. provider found     Patient Name: Reyes Villegas , Alejandra     YOB: 1990  MRN:              [id number]    Chief Complaint: Palpitations    History of Present Illness: Ms. Alejandra Reyes Villegas is a 30 y.o. female (27 week pregnant - 4th pregnancy) with no prior cardiac history who is here for cardiac consultation for palpitations.     services were used in the patient's primary language of Kazakh.     Name or Number: [id number]  Mode of interpretation: iPad    Content of Interpretation:  The patient started having palpitations and shortness of breath starting about a month ago. The patient experiences them daily about 2-3 times a day, lasting about minutes to an hour. The patient feels lightheaded with those episodes.  The patient had a similar episode during her previous pregnancy.  She denies any chest pain.  No syncope.  She does admit to drinking 1 coffee a day.      Cardiovascular Risk Factors:  1. Smoking status: Never smoker  2. Type II Diabetes Mellitus: None   Lab Results   Component Value Date/Time    HBA1C 5.9 (H) 12/03/2018 07:57 AM     3. Hypertension: None  4. Dyslipidemia: None   Cholesterol,Tot   Date Value Ref Range Status   12/03/2018 185 100 - 199 mg/dL Final     LDL   Date Value Ref Range Status   12/03/2018 96 <100 mg/dL Final     HDL   Date Value Ref Range Status   12/03/2018 44 >=40 mg/dL Final     Triglycerides   Date Value Ref Range Status   12/03/2018 227 (H) 0 - 149 mg/dL Final     5. Family history of early Coronary Artery Disease in a first degree relative (Male less than 55 years of age; Female less than 65 years of age): Maternal grandmother with stroke at 63, maternal aunt with MI with age 36  6.  Obesity and/or Metabolic Syndrome: BI 26.89  7. Sedentary lifestyle: Not active    Review of Systems      Constitution: Negative for fever, malaise/fatigue and night sweats.   Cardiovascular: Positive for palpitations. Negative for chest pain, dyspnea on exertion, irregular heartbeat, leg swelling, near-syncope, orthopnea, paroxysmal nocturnal dyspnea and syncope.   Respiratory: Negative for cough, shortness of breath and wheezing.    Gastrointestinal: Negative for abdominal pain, diarrhea, nausea and vomiting.   Neurological: Negative for dizziness, focal weakness and weakness.       All other systems reviewed and are negative.       Current Outpatient Medications   Medication Sig Dispense Refill   • Multiple Vitamins-Minerals (MULTIVITAMIN ADULT PO) Take  by mouth.     • diclofenac EC (VOLTAREN) 25 MG Tablet Delayed Response Take 25 mg by mouth 2 times a day.       No current facility-administered medications for this visit.        No Known Allergies    Physical Exam:  Ambulatory Vitals  /76 (BP Location: Left arm, Patient Position: Sitting, BP Cuff Size: Adult)   Pulse 98   Wt 66.7 kg (147 lb)   SpO2 98%    Oxygen Therapy:  Pulse Oximetry: 98 %  BP Readings from Last 4 Encounters:   01/04/21 106/76   12/17/20 122/69   04/21/19 108/79   12/05/18 110/70       Weight/BMI: Body mass index is 26.89 kg/m².  Wt Readings from Last 4 Encounters:   01/04/21 66.7 kg (147 lb)   04/21/19 62.6 kg (138 lb 0.1 oz)   12/02/18 70.3 kg (155 lb)         General: Well appearing and in no apparent distress  Eyes: nl conjunctiva, no icteric sclera  ENT: wearing a mask, normal external appearance of ears  Neck: no visible JVP,  no carotid bruits  Lungs: normal respiratory effort, CTAB  Heart: RRR, no murmurs, no rubs or gallops,  no edema bilateral lower extremities. No LV/RV heave on cardiac palpatation. + bilateral radial pulses.  + bilateral dp pulses.   Abdomen: Pregnant, soft, non tender, non distended, no masses, normal bowel sounds.  No HSM.  Extremities/MSK: no clubbing, no cyanosis  Neurological: No focal sensory  deficits  Psychiatric: Appropriate affect, A/O x 3, intact judgement and insight  Skin: Warm extremities      Lab Data Review:  Lab Results   Component Value Date/Time    CHOLSTRLTOT 185 12/03/2018 07:57 AM    LDL 96 12/03/2018 07:57 AM    HDL 44 12/03/2018 07:57 AM    TRIGLYCERIDE 227 (H) 12/03/2018 07:57 AM       Lab Results   Component Value Date/Time    SODIUM 138 04/21/2019 02:40 PM    POTASSIUM 4.0 04/21/2019 02:40 PM    CHLORIDE 107 04/21/2019 02:40 PM    CO2 21 04/21/2019 02:40 PM    GLUCOSE 93 04/21/2019 02:40 PM    BUN 11 04/21/2019 02:40 PM    CREATININE 0.66 04/21/2019 02:40 PM     Lab Results   Component Value Date/Time    ALKPHOSPHAT 112 (H) 04/21/2019 02:40 PM    ASTSGOT 12 04/21/2019 02:40 PM    ALTSGPT 11 04/21/2019 02:40 PM    TBILIRUBIN 0.6 04/21/2019 02:40 PM      Lab Results   Component Value Date/Time    WBC 8.4 04/21/2019 02:40 PM     Lab Results   Component Value Date/Time    HBA1C 5.9 (H) 12/03/2018 07:57 AM         Cardiac Imaging and Procedures Review:    EKG dated 11/18/2020: My personal interpretation is sinus rhythm    Echo dated 12/3/2018:   CONCLUSIONS  No prior study is available for comparison.   Normal left ventricular systolic function.  Left ventricular ejection fraction is visually estimated to be 60%.  Normal diastolic function.  Normal inferior vena cava size and inspiratory collapse.  Aortic sclerosis without stenosis.      Assessment & Plan     1. Palpitations  EC-ECHOCARDIOGRAM COMPLETE W/O CONT    Cardiac Event Monitor    TSH    CBC WITH DIFFERENTIAL       Shared Medical Decision Making:    Palpitations  -Obtain event monitor to assess any arrhythmia  -Obtain echocardiogram to assess any structural abnormalities  -Check TSH and CBC to assess thyroid levels and anemia.    All of the patient's excellent questions were answered to the best of my knowledge and to her satisfaction.  It was a pleasure seeing Ms. Alejandra Reyes Villegas in my clinic today. Return in about 8  weeks (around 3/1/2021). Patient is aware to call the cardiology clinic with any questions or concerns.      Yaquelin Huizar MD  [facility] Heart and Vascular [facility] Advanced Medicine, [facility]  [address] [address]  Phone: [phone]  Fax: [phone]

## 2021-02-01 ENCOUNTER — HOSPITAL ENCOUNTER (OUTPATIENT)
Dept: CARDIOLOGY | Facility: MEDICAL CENTER | Age: 31
End: 2021-02-01
Attending: STUDENT IN AN ORGANIZED HEALTH CARE EDUCATION/TRAINING PROGRAM
Payer: COMMERCIAL

## 2021-02-01 DIAGNOSIS — R00.2 PALPITATIONS: ICD-10-CM

## 2021-02-01 PROCEDURE — 93306 TTE W/DOPPLER COMPLETE: CPT

## 2021-02-02 LAB
LV EJECT FRACT MOD 2C 99903: 63.85
LV EJECT FRACT MOD 4C 99902: 71.96
LV EJECT FRACT MOD BP 99901: 67.84

## 2021-02-02 PROCEDURE — 93306 TTE W/DOPPLER COMPLETE: CPT | Mod: 26 | Performed by: STUDENT IN AN ORGANIZED HEALTH CARE EDUCATION/TRAINING PROGRAM

## 2021-02-22 ENCOUNTER — TELEPHONE (OUTPATIENT)
Dept: CARDIOLOGY | Facility: MEDICAL CENTER | Age: 31
End: 2021-02-22

## 2021-02-22 NOTE — TELEPHONE ENCOUNTER
LVM for patient to call and reschedule her follow up appointment with Dr Huizar ( March 1,2021 due to zio monitor being placed on 2/25/2021. Will not have time for results of zio. Told her to schedule out at least a month after monitor is placed due to time (14 days) to wear, time to mail to company. Time for them to download and get us the results in time.

## 2021-02-25 ENCOUNTER — NON-PROVIDER VISIT (OUTPATIENT)
Dept: CARDIOLOGY | Facility: MEDICAL CENTER | Age: 31
End: 2021-02-25
Payer: COMMERCIAL

## 2021-02-25 ENCOUNTER — TELEPHONE (OUTPATIENT)
Dept: CARDIOLOGY | Facility: MEDICAL CENTER | Age: 31
End: 2021-02-25

## 2021-02-25 DIAGNOSIS — R00.2 PALPITATIONS: ICD-10-CM

## 2021-02-25 PROCEDURE — 99999 PR NO CHARGE: CPT | Performed by: STUDENT IN AN ORGANIZED HEALTH CARE EDUCATION/TRAINING PROGRAM

## 2021-02-25 NOTE — TELEPHONE ENCOUNTER
Home enrollment completed for the 14 day Zio patch program per Yaquelin Huizar MD.  Monitor to be mailedd to patient by [email or web address]..    >Currently pending EOS.

## 2021-02-28 ASSESSMENT — ENCOUNTER SYMPTOMS
ABDOMINAL PAIN: 0
IRREGULAR HEARTBEAT: 0
PALPITATIONS: 1
DYSPNEA ON EXERTION: 0
NAUSEA: 0
DIARRHEA: 0
VOMITING: 0
ORTHOPNEA: 0
COUGH: 0
NIGHT SWEATS: 0
SYNCOPE: 0
FOCAL WEAKNESS: 0
WHEEZING: 0
FEVER: 0
PND: 0
SHORTNESS OF BREATH: 0
WEAKNESS: 0
NEAR-SYNCOPE: 0
DIZZINESS: 0

## 2021-03-01 ENCOUNTER — OFFICE VISIT (OUTPATIENT)
Dept: CARDIOLOGY | Facility: MEDICAL CENTER | Age: 31
End: 2021-03-01
Payer: COMMERCIAL

## 2021-03-01 VITALS
OXYGEN SATURATION: 98 % | DIASTOLIC BLOOD PRESSURE: 64 MMHG | WEIGHT: 154 LBS | SYSTOLIC BLOOD PRESSURE: 112 MMHG | BODY MASS INDEX: 28.34 KG/M2 | HEART RATE: 100 BPM | HEIGHT: 62 IN

## 2021-03-01 DIAGNOSIS — R00.2 PALPITATIONS: ICD-10-CM

## 2021-03-01 PROBLEM — Z36.89 ENCOUNTER FOR OTHER SPECIFIED ANTENATAL SCREENING: Status: ACTIVE | Noted: 2020-12-07

## 2021-03-01 PROCEDURE — 99213 OFFICE O/P EST LOW 20 MIN: CPT | Performed by: STUDENT IN AN ORGANIZED HEALTH CARE EDUCATION/TRAINING PROGRAM

## 2021-03-01 ASSESSMENT — FIBROSIS 4 INDEX: FIB4 SCORE: 0.36

## 2021-03-01 NOTE — PROGRESS NOTES
Cardiology Follow-up Consultation Note    Date of note:    3/1/2021    Primary Care Provider: Pcp Pt States None    Patient Name: Reyes Villegas , Alejandra     YOB: 1990  MRN:              [id number]    Chief Complaint: Palpitations    History of Present Illness: Ms. Alejandra Reyes Villegas is a 30 y.o. female (36 week pregnant - 4th pregnancy) with no prior cardiac history who is here for follow-up for palpitations.     services were used in the patient's primary language of Korean.     Name or Number: [id number]  Mode of interpretation: iPad    Content of Interpretation:  The patient was last seen by me in clinic on January 4, 2021.  She reported about a month duration of palpitations.  She underwent an echocardiogram, which showed normal LVEF without any structural normalities.  She has not had a chance to complete event monitor yet, but her  has it scheduled for her soon.  She has stopped coffee/caffeine intake and has not noticed a difference in palpitations. She reports mild lightheadedness with palpitations.  The patient had a similar episode during her previous pregnancy.  She denies any chest pain.  No syncope.      Cardiovascular Risk Factors:  1. Smoking status: Never smoker  2. Type II Diabetes Mellitus: None   Lab Results   Component Value Date/Time    HBA1C 5.9 (H) 12/03/2018 07:57 AM     3. Hypertension: None  4. Dyslipidemia: None   Cholesterol,Tot   Date Value Ref Range Status   12/03/2018 185 100 - 199 mg/dL Final     LDL   Date Value Ref Range Status   12/03/2018 96 <100 mg/dL Final     HDL   Date Value Ref Range Status   12/03/2018 44 >=40 mg/dL Final     Triglycerides   Date Value Ref Range Status   12/03/2018 227 (H) 0 - 149 mg/dL Final     5. Family history of early Coronary Artery Disease in a first degree relative (Male less than 55 years of age; Female less than 65 years of age): Maternal grandmother with stroke at 63, maternal aunt with MI with  "age 36  6.  Obesity and/or Metabolic Syndrome: BI 26.89  7. Sedentary lifestyle: Not active    Review of Systems   Constitution: Negative for fever, malaise/fatigue and night sweats.   Cardiovascular: Positive for palpitations. Negative for chest pain, dyspnea on exertion, irregular heartbeat, leg swelling, near-syncope, orthopnea, paroxysmal nocturnal dyspnea and syncope.   Respiratory: Negative for cough, shortness of breath and wheezing.    Gastrointestinal: Negative for abdominal pain, diarrhea, nausea and vomiting.   Neurological: Negative for dizziness, focal weakness and weakness.       All other systems reviewed and are negative.       Current Outpatient Medications   Medication Sig Dispense Refill   • Multiple Vitamins-Minerals (MULTIVITAMIN ADULT PO) Take  by mouth.     • diclofenac EC (VOLTAREN) 25 MG Tablet Delayed Response Take 25 mg by mouth 2 times a day.       No current facility-administered medications for this visit.       No Known Allergies    Physical Exam:  Ambulatory Vitals  /64 (BP Location: Left arm, Patient Position: Sitting)   Pulse 100   Ht 1.575 m (5' 2\")   Wt 69.9 kg (154 lb)   SpO2 98%    Oxygen Therapy:  Pulse Oximetry: 98 %  BP Readings from Last 4 Encounters:   03/01/21 112/64   01/04/21 106/76   12/17/20 122/69   04/21/19 108/79       Weight/BMI: Body mass index is 28.17 kg/m².  Wt Readings from Last 4 Encounters:   03/01/21 69.9 kg (154 lb)   01/04/21 66.7 kg (147 lb)   04/21/19 62.6 kg (138 lb 0.1 oz)   12/02/18 70.3 kg (155 lb)         General: Well appearing and in no apparent distress  Eyes: nl conjunctiva, no icteric sclera  ENT: wearing a mask, normal external appearance of ears  Neck: no visible JVP,  no carotid bruits  Lungs: normal respiratory effort, CTAB  Heart: Tachycardic rate, regular rhythm, no murmurs, no rubs or gallops,  no edema bilateral lower extremities. No LV/RV heave on cardiac palpatation. + bilateral radial pulses.  + bilateral dp pulses. "   Abdomen: Pregnant, soft, non tender, non distended, no masses, normal bowel sounds.  No HSM.  Extremities/MSK: no clubbing, no cyanosis  Neurological: No focal sensory deficits  Psychiatric: Appropriate affect, A/O x 3, intact judgement and insight  Skin: Warm extremities      Lab Data Review:  Lab Results   Component Value Date/Time    CHOLSTRLTOT 185 12/03/2018 07:57 AM    LDL 96 12/03/2018 07:57 AM    HDL 44 12/03/2018 07:57 AM    TRIGLYCERIDE 227 (H) 12/03/2018 07:57 AM       Lab Results   Component Value Date/Time    SODIUM 138 04/21/2019 02:40 PM    POTASSIUM 4.0 04/21/2019 02:40 PM    CHLORIDE 107 04/21/2019 02:40 PM    CO2 21 04/21/2019 02:40 PM    GLUCOSE 93 04/21/2019 02:40 PM    BUN 11 04/21/2019 02:40 PM    CREATININE 0.66 04/21/2019 02:40 PM     Lab Results   Component Value Date/Time    ALKPHOSPHAT 112 (H) 04/21/2019 02:40 PM    ASTSGOT 12 04/21/2019 02:40 PM    ALTSGPT 11 04/21/2019 02:40 PM    TBILIRUBIN 0.6 04/21/2019 02:40 PM      Lab Results   Component Value Date/Time    WBC 8.4 04/21/2019 02:40 PM     Lab Results   Component Value Date/Time    HBA1C 5.9 (H) 12/03/2018 07:57 AM         Cardiac Imaging and Procedures Review:    EKG dated 11/18/2020: My personal interpretation is sinus rhythm    Echo dated 2/2/2021  CONCLUSIONS  Normal left ventricular systolic function. Left ventricular ejection   fraction is visually estimated to be 65%.   Normal right ventricular size and function.  No significant valvular abnormalities.  Compared to the images of the prior study done 12/03/2018, no   significant change.    Echo dated 12/3/2018:   CONCLUSIONS  No prior study is available for comparison.   Normal left ventricular systolic function.  Left ventricular ejection fraction is visually estimated to be 60%.  Normal diastolic function.  Normal inferior vena cava size and inspiratory collapse.  Aortic sclerosis without stenosis.      Assessment & Plan     1. Palpitations         Shared Medical Decision  Making:    Palpitations  Echocardiogram showed normal LVEF without any structural abnormalities  -Pending event monitor to assess any arrhythmia   -Discussed starting a low-dose beta-blocker to help with symptoms. However, given that she is almost due, she does not want to take any medication at this time, which I think is reasonable.  -Pending TSH and CBC to assess thyroid levels and anemia.    All of the patient's excellent questions were answered to the best of my knowledge and to her satisfaction.  It was a pleasure seeing Ms. Alejandra Reyes Villegas in my clinic today. Return in about 2 months (around 5/1/2021). Patient is aware to call the cardiology clinic with any questions or concerns.      Yaquelin Huizar MD  [facility]  [facility]  [address] [address]  Phone: [phone]  Fax: [phone]

## 2021-03-08 ENCOUNTER — HOSPITAL ENCOUNTER (EMERGENCY)
Facility: MEDICAL CENTER | Age: 31
End: 2021-03-08
Attending: OBSTETRICS & GYNECOLOGY | Admitting: OBSTETRICS & GYNECOLOGY
Payer: COMMERCIAL

## 2021-03-08 ENCOUNTER — APPOINTMENT (OUTPATIENT)
Dept: RADIOLOGY | Facility: MEDICAL CENTER | Age: 31
End: 2021-03-08
Attending: OBSTETRICS & GYNECOLOGY
Payer: COMMERCIAL

## 2021-03-08 VITALS
WEIGHT: 155 LBS | SYSTOLIC BLOOD PRESSURE: 110 MMHG | TEMPERATURE: 98.6 F | RESPIRATION RATE: 18 BRPM | HEART RATE: 111 BPM | BODY MASS INDEX: 29.27 KG/M2 | DIASTOLIC BLOOD PRESSURE: 65 MMHG | HEIGHT: 61 IN | OXYGEN SATURATION: 96 %

## 2021-03-08 PROCEDURE — 76819 FETAL BIOPHYS PROFIL W/O NST: CPT

## 2021-03-08 PROCEDURE — 59025 FETAL NON-STRESS TEST: CPT

## 2021-03-08 PROCEDURE — 99284 EMERGENCY DEPT VISIT MOD MDM: CPT

## 2021-03-08 RX ORDER — ACETAMINOPHEN 325 MG/1
650 TABLET ORAL EVERY 4 HOURS PRN
Status: ON HOLD | COMMUNITY
End: 2021-03-30

## 2021-03-08 ASSESSMENT — FIBROSIS 4 INDEX: FIB4 SCORE: 0.36

## 2021-03-09 NOTE — PROGRESS NOTES
Patient comes in with complaints of contractions.  She denies leaking or bleeding. She feel fetal movement.  Monitors applied.  Decel noted, Dr José dugan called.  BPP ordered.  BPP is 8/8.  SVE is 1/70/-2.  Dr José dugan notified.  Tracing reviewed.  NST is reactive.  Tracing reviewed with Dr Kelly.    Patient to be discharged.  She has an appointment with Dr Kelly tomorrow and is to keep the appointment. Discharge instructions reviewed with .  Questions answered.  Labor precautions and comfort measures reviewed.

## 2021-03-09 NOTE — ED PROVIDER NOTES
"Emergency Obstetric Consultation     Date of Service  3/8/2021    Reason for Consultation  Pt presents with CTXs    History of Presenting Illness  30 y.o. female  who presented 3/8/2021 with CTXs @ 36 3/7 weeks with 2 previous c/sections . She states overnight she could not sleep but has only felt 2-3 CTXs since being here for several hours.     Review of Systems  Denies S&S of PIH, reports active FM and denies vb or LOF    Obstetric History    OB History    Para Term  AB Living   3 2 2     2   SAB TAB Ectopic Molar Multiple Live Births           0 2      # Outcome Date GA Lbr Chris/2nd Weight Sex Delivery Anes PTL Lv   3 Current            2 Term 18 37w2d  2.715 kg (5 lb 15.8 oz) M CS-LTranv Spinal N MEGGAN   1 Term      CS-Unspec   MEGGAN       Gynecologic History  No LMP recorded. Patient is pregnant. EDC 21      Medical History   has a past medical history of Psychiatric problem.    Surgical History   has a past surgical history that includes gyn surgery and repeat c section (2018).    Family History  family history is not on file.    Social History   reports that she has never smoked. She has never used smokeless tobacco. She reports previous alcohol use. She reports that she does not use drugs.    Medications  PNVs    Allergies  No Known Allergies    Physical Exam  Vitals:    21 1427 21 1428 21 1431 21 1432   BP:  110/65 110/65    Pulse: (!) 115 (!) 111 (!) 111    Resp:   18    Temp:   37 °C (98.6 °F)    TempSrc:   Temporal    SpO2: 96%      Weight:    70.3 kg (155 lb)   Height:    1.549 m (5' 1\")       Physical Exam  CX /-2 per RN  FHTS reactive, reassuring, had one decel for 3 minutes but now has had neagtive sponmtaneous CST and reactive tracing with moderate variability and accels.                  No results for input(s): NTPROBNP in the last 72 hours.         Imaging  BPP  with TAN=17.     Assessment  IUP @ 36 3/7 weeks  Alejandra Reyes " Javed 30 y.o. year old  36w3d with CTXs and a fetal decel on monitor and BPP 10/10 and a negative spontaneous CST. Will d/c home and she has an appt tomorrow in the office with me. Strict prec. ?S answered.  translated and they are comfortable with d/c home at this time.     Plan  1.see above    Michelle Kelly M.D.

## 2021-03-12 LAB — STREP GP B DNA PCR: NEGATIVE

## 2021-03-17 ENCOUNTER — APPOINTMENT (OUTPATIENT)
Dept: ADMISSIONS | Facility: MEDICAL CENTER | Age: 31
End: 2021-03-17
Payer: COMMERCIAL

## 2021-03-19 ENCOUNTER — TELEPHONE (OUTPATIENT)
Dept: CARDIOLOGY | Facility: MEDICAL CENTER | Age: 31
End: 2021-03-19

## 2021-03-19 ENCOUNTER — APPOINTMENT (OUTPATIENT)
Dept: CARDIOLOGY | Facility: MEDICAL CENTER | Age: 31
End: 2021-03-19
Payer: COMMERCIAL

## 2021-03-27 ENCOUNTER — HOSPITAL ENCOUNTER (OUTPATIENT)
Dept: OBGYN | Facility: MEDICAL CENTER | Age: 31
End: 2021-03-27
Attending: OBSTETRICS & GYNECOLOGY | Admitting: OBSTETRICS & GYNECOLOGY
Payer: COMMERCIAL

## 2021-03-27 PROCEDURE — U0005 INFEC AGEN DETEC AMPLI PROBE: HCPCS

## 2021-03-27 PROCEDURE — U0003 INFECTIOUS AGENT DETECTION BY NUCLEIC ACID (DNA OR RNA); SEVERE ACUTE RESPIRATORY SYNDROME CORONAVIRUS 2 (SARS-COV-2) (CORONAVIRUS DISEASE [COVID-19]), AMPLIFIED PROBE TECHNIQUE, MAKING USE OF HIGH THROUGHPUT TECHNOLOGIES AS DESCRIBED BY CMS-2020-01-R: HCPCS

## 2021-03-27 NOTE — PROGRESS NOTES
1022- Covid swab collected, self isolation instructions given. All questions and concerns were answered. Pt left ambulatory in stable condition.

## 2021-03-28 LAB
SARS-COV-2 RNA RESP QL NAA+PROBE: NOTDETECTED
SPECIMEN SOURCE: NORMAL

## 2021-03-29 ENCOUNTER — ANESTHESIA EVENT (OUTPATIENT)
Dept: OBGYN | Facility: MEDICAL CENTER | Age: 31
End: 2021-03-29
Payer: COMMERCIAL

## 2021-03-30 ENCOUNTER — HOSPITAL ENCOUNTER (INPATIENT)
Facility: MEDICAL CENTER | Age: 31
LOS: 3 days | End: 2021-04-02
Attending: OBSTETRICS & GYNECOLOGY | Admitting: OBSTETRICS & GYNECOLOGY
Payer: COMMERCIAL

## 2021-03-30 ENCOUNTER — ANESTHESIA (OUTPATIENT)
Dept: OBGYN | Facility: MEDICAL CENTER | Age: 31
End: 2021-03-30
Payer: COMMERCIAL

## 2021-03-30 DIAGNOSIS — G89.18 POST-OPERATIVE PAIN: ICD-10-CM

## 2021-03-30 LAB
BASOPHILS # BLD AUTO: 0.4 % (ref 0–1.8)
BASOPHILS # BLD: 0.03 K/UL (ref 0–0.12)
EOSINOPHIL # BLD AUTO: 0.03 K/UL (ref 0–0.51)
EOSINOPHIL NFR BLD: 0.4 % (ref 0–6.9)
ERYTHROCYTE [DISTWIDTH] IN BLOOD BY AUTOMATED COUNT: 46.3 FL (ref 35.9–50)
HCT VFR BLD AUTO: 40.2 % (ref 37–47)
HGB BLD-MCNC: 13.5 G/DL (ref 12–16)
HOLDING TUBE BB 8507: NORMAL
IMM GRANULOCYTES # BLD AUTO: 0.11 K/UL (ref 0–0.11)
IMM GRANULOCYTES NFR BLD AUTO: 1.3 % (ref 0–0.9)
LYMPHOCYTES # BLD AUTO: 2.18 K/UL (ref 1–4.8)
LYMPHOCYTES NFR BLD: 25.9 % (ref 22–41)
MCH RBC QN AUTO: 31.3 PG (ref 27–33)
MCHC RBC AUTO-ENTMCNC: 33.6 G/DL (ref 33.6–35)
MCV RBC AUTO: 93.1 FL (ref 81.4–97.8)
MONOCYTES # BLD AUTO: 0.6 K/UL (ref 0–0.85)
MONOCYTES NFR BLD AUTO: 7.1 % (ref 0–13.4)
NEUTROPHILS # BLD AUTO: 5.46 K/UL (ref 2–7.15)
NEUTROPHILS NFR BLD: 64.9 % (ref 44–72)
NRBC # BLD AUTO: 0 K/UL
NRBC BLD-RTO: 0 /100 WBC
PATHOLOGY CONSULT NOTE: NORMAL
PLATELET # BLD AUTO: 156 K/UL (ref 164–446)
PMV BLD AUTO: 12.2 FL (ref 9–12.9)
RBC # BLD AUTO: 4.32 M/UL (ref 4.2–5.4)
WBC # BLD AUTO: 8.4 K/UL (ref 4.8–10.8)

## 2021-03-30 PROCEDURE — 160002 HCHG RECOVERY MINUTES (STAT): Performed by: OBSTETRICS & GYNECOLOGY

## 2021-03-30 PROCEDURE — 36415 COLL VENOUS BLD VENIPUNCTURE: CPT

## 2021-03-30 PROCEDURE — 700111 HCHG RX REV CODE 636 W/ 250 OVERRIDE (IP): Performed by: ANESTHESIOLOGY

## 2021-03-30 PROCEDURE — 160048 HCHG OR STATISTICAL LEVEL 1-5: Performed by: OBSTETRICS & GYNECOLOGY

## 2021-03-30 PROCEDURE — 160035 HCHG PACU - 1ST 60 MINS PHASE I: Performed by: OBSTETRICS & GYNECOLOGY

## 2021-03-30 PROCEDURE — 160009 HCHG ANES TIME/MIN: Performed by: OBSTETRICS & GYNECOLOGY

## 2021-03-30 PROCEDURE — 700105 HCHG RX REV CODE 258: Performed by: ANESTHESIOLOGY

## 2021-03-30 PROCEDURE — A9270 NON-COVERED ITEM OR SERVICE: HCPCS | Performed by: ANESTHESIOLOGY

## 2021-03-30 PROCEDURE — 700101 HCHG RX REV CODE 250: Performed by: ANESTHESIOLOGY

## 2021-03-30 PROCEDURE — 700102 HCHG RX REV CODE 250 W/ 637 OVERRIDE(OP): Performed by: ANESTHESIOLOGY

## 2021-03-30 PROCEDURE — 700111 HCHG RX REV CODE 636 W/ 250 OVERRIDE (IP)

## 2021-03-30 PROCEDURE — 770002 HCHG ROOM/CARE - OB PRIVATE (112)

## 2021-03-30 PROCEDURE — 160029 HCHG SURGERY MINUTES - 1ST 30 MINS LEVEL 4: Performed by: OBSTETRICS & GYNECOLOGY

## 2021-03-30 PROCEDURE — 88302 TISSUE EXAM BY PATHOLOGIST: CPT | Mod: 59

## 2021-03-30 PROCEDURE — 160041 HCHG SURGERY MINUTES - EA ADDL 1 MIN LEVEL 4: Performed by: OBSTETRICS & GYNECOLOGY

## 2021-03-30 PROCEDURE — 0UB70ZZ EXCISION OF BILATERAL FALLOPIAN TUBES, OPEN APPROACH: ICD-10-PCS | Performed by: OBSTETRICS & GYNECOLOGY

## 2021-03-30 PROCEDURE — 85025 COMPLETE CBC W/AUTO DIFF WBC: CPT

## 2021-03-30 RX ORDER — OXYCODONE HCL 5 MG/5 ML
5 SOLUTION, ORAL ORAL
Status: DISCONTINUED | OUTPATIENT
Start: 2021-03-30 | End: 2021-03-30 | Stop reason: HOSPADM

## 2021-03-30 RX ORDER — SODIUM CHLORIDE, SODIUM GLUCONATE, SODIUM ACETATE, POTASSIUM CHLORIDE AND MAGNESIUM CHLORIDE 526; 502; 368; 37; 30 MG/100ML; MG/100ML; MG/100ML; MG/100ML; MG/100ML
INJECTION, SOLUTION INTRAVENOUS
Status: DISCONTINUED | OUTPATIENT
Start: 2021-03-30 | End: 2021-03-30 | Stop reason: SURG

## 2021-03-30 RX ORDER — ONDANSETRON 2 MG/ML
4 INJECTION INTRAMUSCULAR; INTRAVENOUS
Status: DISCONTINUED | OUTPATIENT
Start: 2021-03-30 | End: 2021-03-30 | Stop reason: HOSPADM

## 2021-03-30 RX ORDER — OXYCODONE HYDROCHLORIDE 5 MG/1
5 TABLET ORAL EVERY 4 HOURS PRN
Status: DISCONTINUED | OUTPATIENT
Start: 2021-03-30 | End: 2021-03-31

## 2021-03-30 RX ORDER — HYDROMORPHONE HYDROCHLORIDE 1 MG/ML
0.4 INJECTION, SOLUTION INTRAMUSCULAR; INTRAVENOUS; SUBCUTANEOUS
Status: DISCONTINUED | OUTPATIENT
Start: 2021-03-30 | End: 2021-03-30 | Stop reason: HOSPADM

## 2021-03-30 RX ORDER — SIMETHICONE 80 MG
80 TABLET,CHEWABLE ORAL 4 TIMES DAILY PRN
Status: DISCONTINUED | OUTPATIENT
Start: 2021-03-30 | End: 2021-04-02 | Stop reason: HOSPADM

## 2021-03-30 RX ORDER — ACETAMINOPHEN 500 MG
1000 TABLET ORAL EVERY 6 HOURS
Status: COMPLETED | OUTPATIENT
Start: 2021-03-30 | End: 2021-03-31

## 2021-03-30 RX ORDER — DIPHENHYDRAMINE HYDROCHLORIDE 50 MG/ML
25 INJECTION INTRAMUSCULAR; INTRAVENOUS EVERY 6 HOURS PRN
Status: DISCONTINUED | OUTPATIENT
Start: 2021-03-30 | End: 2021-03-31

## 2021-03-30 RX ORDER — PHENYLEPHRINE HCL IN 0.9% NACL 0.5 MG/5ML
SYRINGE (ML) INTRAVENOUS PRN
Status: DISCONTINUED | OUTPATIENT
Start: 2021-03-30 | End: 2021-03-30 | Stop reason: SURG

## 2021-03-30 RX ORDER — HYDROMORPHONE HYDROCHLORIDE 1 MG/ML
0.2 INJECTION, SOLUTION INTRAMUSCULAR; INTRAVENOUS; SUBCUTANEOUS
Status: DISCONTINUED | OUTPATIENT
Start: 2021-03-30 | End: 2021-03-31

## 2021-03-30 RX ORDER — MEPERIDINE HYDROCHLORIDE 25 MG/ML
6.25 INJECTION INTRAMUSCULAR; INTRAVENOUS; SUBCUTANEOUS
Status: DISCONTINUED | OUTPATIENT
Start: 2021-03-30 | End: 2021-03-30 | Stop reason: HOSPADM

## 2021-03-30 RX ORDER — DOCUSATE SODIUM 100 MG/1
100 CAPSULE, LIQUID FILLED ORAL 2 TIMES DAILY PRN
Status: DISCONTINUED | OUTPATIENT
Start: 2021-03-30 | End: 2021-04-02 | Stop reason: HOSPADM

## 2021-03-30 RX ORDER — HALOPERIDOL 5 MG/ML
1 INJECTION INTRAMUSCULAR
Status: DISCONTINUED | OUTPATIENT
Start: 2021-03-30 | End: 2021-03-30 | Stop reason: HOSPADM

## 2021-03-30 RX ORDER — ONDANSETRON 2 MG/ML
4 INJECTION INTRAMUSCULAR; INTRAVENOUS EVERY 6 HOURS PRN
Status: DISCONTINUED | OUTPATIENT
Start: 2021-03-30 | End: 2021-03-31

## 2021-03-30 RX ORDER — OXYCODONE HYDROCHLORIDE 5 MG/1
10 TABLET ORAL EVERY 4 HOURS PRN
Status: DISCONTINUED | OUTPATIENT
Start: 2021-03-30 | End: 2021-03-31

## 2021-03-30 RX ORDER — SODIUM CHLORIDE, SODIUM GLUCONATE, SODIUM ACETATE, POTASSIUM CHLORIDE AND MAGNESIUM CHLORIDE 526; 502; 368; 37; 30 MG/100ML; MG/100ML; MG/100ML; MG/100ML; MG/100ML
500 INJECTION, SOLUTION INTRAVENOUS CONTINUOUS
Status: DISCONTINUED | OUTPATIENT
Start: 2021-03-30 | End: 2021-04-01 | Stop reason: HOSPADM

## 2021-03-30 RX ORDER — KETOROLAC TROMETHAMINE 30 MG/ML
INJECTION, SOLUTION INTRAMUSCULAR; INTRAVENOUS PRN
Status: DISCONTINUED | OUTPATIENT
Start: 2021-03-30 | End: 2021-03-30 | Stop reason: SURG

## 2021-03-30 RX ORDER — SODIUM CHLORIDE, SODIUM GLUCONATE, SODIUM ACETATE, POTASSIUM CHLORIDE AND MAGNESIUM CHLORIDE 526; 502; 368; 37; 30 MG/100ML; MG/100ML; MG/100ML; MG/100ML; MG/100ML
1500 INJECTION, SOLUTION INTRAVENOUS ONCE
Status: COMPLETED | OUTPATIENT
Start: 2021-03-30 | End: 2021-03-30

## 2021-03-30 RX ORDER — OXYCODONE HCL 5 MG/5 ML
10 SOLUTION, ORAL ORAL
Status: DISCONTINUED | OUTPATIENT
Start: 2021-03-30 | End: 2021-03-30 | Stop reason: HOSPADM

## 2021-03-30 RX ORDER — DIPHENHYDRAMINE HYDROCHLORIDE 50 MG/ML
INJECTION INTRAMUSCULAR; INTRAVENOUS PRN
Status: DISCONTINUED | OUTPATIENT
Start: 2021-03-30 | End: 2021-03-30 | Stop reason: SURG

## 2021-03-30 RX ORDER — CEFAZOLIN SODIUM 1 G/3ML
INJECTION, POWDER, FOR SOLUTION INTRAMUSCULAR; INTRAVENOUS PRN
Status: DISCONTINUED | OUTPATIENT
Start: 2021-03-30 | End: 2021-03-30 | Stop reason: SURG

## 2021-03-30 RX ORDER — CITRIC ACID/SODIUM CITRATE 334-500MG
30 SOLUTION, ORAL ORAL ONCE
Status: COMPLETED | OUTPATIENT
Start: 2021-03-30 | End: 2021-03-30

## 2021-03-30 RX ORDER — HYDROMORPHONE HYDROCHLORIDE 1 MG/ML
0.1 INJECTION, SOLUTION INTRAMUSCULAR; INTRAVENOUS; SUBCUTANEOUS
Status: DISCONTINUED | OUTPATIENT
Start: 2021-03-30 | End: 2021-03-30 | Stop reason: HOSPADM

## 2021-03-30 RX ORDER — SODIUM CHLORIDE, SODIUM LACTATE, POTASSIUM CHLORIDE, CALCIUM CHLORIDE 600; 310; 30; 20 MG/100ML; MG/100ML; MG/100ML; MG/100ML
INJECTION, SOLUTION INTRAVENOUS PRN
Status: DISCONTINUED | OUTPATIENT
Start: 2021-03-30 | End: 2021-04-02 | Stop reason: HOSPADM

## 2021-03-30 RX ORDER — OXYTOCIN 10 [USP'U]/ML
INJECTION, SOLUTION INTRAMUSCULAR; INTRAVENOUS PRN
Status: DISCONTINUED | OUTPATIENT
Start: 2021-03-30 | End: 2021-03-30 | Stop reason: SURG

## 2021-03-30 RX ORDER — MISOPROSTOL 200 UG/1
800 TABLET ORAL
Status: DISCONTINUED | OUTPATIENT
Start: 2021-03-30 | End: 2021-04-02 | Stop reason: HOSPADM

## 2021-03-30 RX ORDER — ONDANSETRON 2 MG/ML
INJECTION INTRAMUSCULAR; INTRAVENOUS PRN
Status: DISCONTINUED | OUTPATIENT
Start: 2021-03-30 | End: 2021-03-30 | Stop reason: SURG

## 2021-03-30 RX ORDER — METOCLOPRAMIDE HYDROCHLORIDE 5 MG/ML
10 INJECTION INTRAMUSCULAR; INTRAVENOUS ONCE
Status: COMPLETED | OUTPATIENT
Start: 2021-03-30 | End: 2021-03-30

## 2021-03-30 RX ORDER — KETOROLAC TROMETHAMINE 30 MG/ML
30 INJECTION, SOLUTION INTRAMUSCULAR; INTRAVENOUS EVERY 6 HOURS
Status: DISCONTINUED | OUTPATIENT
Start: 2021-03-30 | End: 2021-03-31

## 2021-03-30 RX ORDER — DIPHENHYDRAMINE HYDROCHLORIDE 50 MG/ML
12.5 INJECTION INTRAMUSCULAR; INTRAVENOUS EVERY 6 HOURS PRN
Status: DISCONTINUED | OUTPATIENT
Start: 2021-03-30 | End: 2021-03-31

## 2021-03-30 RX ORDER — MORPHINE SULFATE 0.5 MG/ML
INJECTION, SOLUTION EPIDURAL; INTRATHECAL; INTRAVENOUS
Status: COMPLETED | OUTPATIENT
Start: 2021-03-30 | End: 2021-03-30

## 2021-03-30 RX ORDER — HYDROMORPHONE HYDROCHLORIDE 1 MG/ML
0.4 INJECTION, SOLUTION INTRAMUSCULAR; INTRAVENOUS; SUBCUTANEOUS
Status: DISCONTINUED | OUTPATIENT
Start: 2021-03-30 | End: 2021-03-31

## 2021-03-30 RX ORDER — HYDROMORPHONE HYDROCHLORIDE 1 MG/ML
0.2 INJECTION, SOLUTION INTRAMUSCULAR; INTRAVENOUS; SUBCUTANEOUS
Status: DISCONTINUED | OUTPATIENT
Start: 2021-03-30 | End: 2021-03-30 | Stop reason: HOSPADM

## 2021-03-30 RX ORDER — BUPIVACAINE HYDROCHLORIDE 7.5 MG/ML
INJECTION, SOLUTION INTRASPINAL
Status: COMPLETED | OUTPATIENT
Start: 2021-03-30 | End: 2021-03-30

## 2021-03-30 RX ORDER — DIPHENHYDRAMINE HYDROCHLORIDE 50 MG/ML
12.5 INJECTION INTRAMUSCULAR; INTRAVENOUS
Status: DISCONTINUED | OUTPATIENT
Start: 2021-03-30 | End: 2021-03-30 | Stop reason: HOSPADM

## 2021-03-30 RX ADMIN — Medication 200 MCG: at 13:01

## 2021-03-30 RX ADMIN — Medication 200 MCG: at 13:08

## 2021-03-30 RX ADMIN — SODIUM CITRATE AND CITRIC ACID MONOHYDRATE 30 ML: 500; 334 SOLUTION ORAL at 12:25

## 2021-03-30 RX ADMIN — MORPHINE SULFATE 100 MCG: 0.5 INJECTION, SOLUTION EPIDURAL; INTRATHECAL; INTRAVENOUS at 12:36

## 2021-03-30 RX ADMIN — Medication 200 MCG: at 13:05

## 2021-03-30 RX ADMIN — OXYTOCIN 20 UNITS: 10 INJECTION, SOLUTION INTRAMUSCULAR; INTRAVENOUS at 12:58

## 2021-03-30 RX ADMIN — OXYTOCIN 125 ML/HR: 10 INJECTION, SOLUTION INTRAMUSCULAR; INTRAVENOUS at 13:48

## 2021-03-30 RX ADMIN — ACETAMINOPHEN 1000 MG: 500 TABLET, FILM COATED ORAL at 16:05

## 2021-03-30 RX ADMIN — DIPHENHYDRAMINE HYDROCHLORIDE 12.5 MG: 50 INJECTION, SOLUTION INTRAMUSCULAR; INTRAVENOUS at 13:07

## 2021-03-30 RX ADMIN — Medication 100 MCG: at 13:13

## 2021-03-30 RX ADMIN — PHENYLEPHRINE HYDROCHLORIDE 20 MCG/MIN: 10 INJECTION INTRAVENOUS at 12:40

## 2021-03-30 RX ADMIN — FENTANYL CITRATE 15 MCG: 50 INJECTION, SOLUTION INTRAMUSCULAR; INTRAVENOUS at 12:36

## 2021-03-30 RX ADMIN — KETOROLAC TROMETHAMINE 30 MG: 30 INJECTION, SOLUTION INTRAMUSCULAR; INTRAVENOUS at 19:17

## 2021-03-30 RX ADMIN — ACETAMINOPHEN 1000 MG: 500 TABLET, FILM COATED ORAL at 22:04

## 2021-03-30 RX ADMIN — SODIUM CHLORIDE, SODIUM GLUCONATE, SODIUM ACETATE, POTASSIUM CHLORIDE AND MAGNESIUM CHLORIDE 1500 ML: 526; 502; 368; 37; 30 INJECTION, SOLUTION INTRAVENOUS at 11:00

## 2021-03-30 RX ADMIN — KETOROLAC TROMETHAMINE 30 MG: 30 INJECTION, SOLUTION INTRAMUSCULAR at 13:14

## 2021-03-30 RX ADMIN — FAMOTIDINE 20 MG: 10 INJECTION INTRAVENOUS at 12:25

## 2021-03-30 RX ADMIN — ONDANSETRON 4 MG: 2 INJECTION INTRAMUSCULAR; INTRAVENOUS at 12:45

## 2021-03-30 RX ADMIN — Medication 100 MCG: at 13:17

## 2021-03-30 RX ADMIN — BUPIVACAINE HYDROCHLORIDE IN DEXTROSE 1.5 ML: 7.5 INJECTION, SOLUTION SUBARACHNOID at 12:36

## 2021-03-30 RX ADMIN — METOCLOPRAMIDE 10 MG: 5 INJECTION, SOLUTION INTRAMUSCULAR; INTRAVENOUS at 12:25

## 2021-03-30 RX ADMIN — CEFAZOLIN 2 G: 330 INJECTION, POWDER, FOR SOLUTION INTRAMUSCULAR; INTRAVENOUS at 12:39

## 2021-03-30 RX ADMIN — SODIUM CHLORIDE, SODIUM GLUCONATE, SODIUM ACETATE, POTASSIUM CHLORIDE AND MAGNESIUM CHLORIDE: 526; 502; 368; 37; 30 INJECTION, SOLUTION INTRAVENOUS at 12:34

## 2021-03-30 ASSESSMENT — EDINBURGH POSTNATAL DEPRESSION SCALE (EPDS)
I HAVE BEEN ABLE TO LAUGH AND SEE THE FUNNY SIDE OF THINGS: NOT QUITE SO MUCH NOW
I HAVE BLAMED MYSELF UNNECESSARILY WHEN THINGS WENT WRONG: YES, SOME OF THE TIME
THINGS HAVE BEEN GETTING ON TOP OF ME: YES, SOMETIMES I HAVEN'T BEEN COPING AS WELL AS USUAL
I HAVE FELT SCARED OR PANICKY FOR NO GOOD REASON: YES, SOMETIMES
I HAVE BEEN SO UNHAPPY THAT I HAVE BEEN CRYING: ONLY OCCASIONALLY
I HAVE LOOKED FORWARD WITH ENJOYMENT TO THINGS: DEFINITELY LESS THAN I USED TO
I HAVE BEEN SO UNHAPPY THAT I HAVE HAD DIFFICULTY SLEEPING: NOT VERY OFTEN
I HAVE FELT SAD OR MISERABLE: NOT VERY OFTEN
I HAVE BEEN ANXIOUS OR WORRIED FOR NO GOOD REASON: YES, SOMETIMES
THE THOUGHT OF HARMING MYSELF HAS OCCURRED TO ME: NEVER

## 2021-03-30 ASSESSMENT — COPD QUESTIONNAIRES
DO YOU EVER COUGH UP ANY MUCUS OR PHLEGM?: NO/ONLY WITH OCCASIONAL COLDS OR INFECTIONS
IN THE PAST 12 MONTHS DO YOU DO LESS THAN YOU USED TO BECAUSE OF YOUR BREATHING PROBLEMS: DISAGREE/UNSURE
DURING THE PAST 4 WEEKS HOW MUCH DID YOU FEEL SHORT OF BREATH: NONE/LITTLE OF THE TIME
HAVE YOU SMOKED AT LEAST 100 CIGARETTES IN YOUR ENTIRE LIFE: NO/DON'T KNOW
COPD SCREENING SCORE: 0

## 2021-03-30 ASSESSMENT — LIFESTYLE VARIABLES
ALCOHOL_USE: NO
EVER_SMOKED: NEVER

## 2021-03-30 ASSESSMENT — PATIENT HEALTH QUESTIONNAIRE - PHQ9
1. LITTLE INTEREST OR PLEASURE IN DOING THINGS: NOT AT ALL
SUM OF ALL RESPONSES TO PHQ9 QUESTIONS 1 AND 2: 0
2. FEELING DOWN, DEPRESSED, IRRITABLE, OR HOPELESS: NOT AT ALL

## 2021-03-30 ASSESSMENT — PAIN DESCRIPTION - PAIN TYPE: TYPE: SURGICAL PAIN

## 2021-03-30 ASSESSMENT — FIBROSIS 4 INDEX: FIB4 SCORE: 0.36

## 2021-03-30 ASSESSMENT — PAIN SCALES - GENERAL: PAINLEVEL: 0 - NO PAIN

## 2021-03-30 NOTE — PROGRESS NOTES
Admit H&P dictated # [id number]  A- IUP @ 39 4/7 weeks  Previous c/s X 2  SGA versus IUGR   Desires permanent sterilization  GBS negative  P- RLTCS and bilateral salpingectomy    awestfall

## 2021-03-30 NOTE — ANESTHESIA PROCEDURE NOTES
Spinal Block    Date/Time: 3/30/2021 12:36 PM  Performed by: Abdiaziz Brownlee M.D.  Authorized by: Abdiaziz Brownlee M.D.     Patient Location:  OB  Start Time:  3/30/2021 12:36 PM  End Time:  3/30/2021 12:38 PM  Reason for Block: primary anesthetic    patient identified, IV checked, site marked, risks and benefits discussed, surgical consent, monitors and equipment checked, pre-op evaluation and timeout performed    Patient Position:  Sitting  Prep: ChloraPrep, patient draped and sterile technique    Monitoring:  Blood pressure, continuous pulse oximetry and heart rate  Approach:  Midline  Location:  L3-4  Injection Technique:  Single-shot  Skin infiltration:  Lidocaine  Strength:  1%  Dose:  3ml  Needle Type:  Pencan  Needle Gauge:  25 G  CSF flowing pre/post injection:  Yes  Sensory Level:  T4

## 2021-03-30 NOTE — ANESTHESIA TIME REPORT
Anesthesia Start and Stop Event Times     Date Time Event    3/30/2021 1155 Ready for Procedure     1234 Anesthesia Start     1335 Anesthesia Stop        Responsible Staff  21    Name Role Begin End    Abdiaziz Brownlee M.D. Anesth 1234 1335        Preop Diagnosis (Free Text):  Pre-op Diagnosis     PREVIOUS  SECTION   39W        Preop Diagnosis (Codes):  Diagnosis Information     Diagnosis Code(s):  delivery delivered [O82]        Post op Diagnosis  Labor and delivery, indication for care      Premium Reason  Non-Premium    Comments:

## 2021-03-30 NOTE — ANESTHESIA PREPROCEDURE EVALUATION
Repeat .    Relevant Problems   CARDIAC   (+) Migraines      OB   (+) H/O:  section       Physical Exam    Airway   Mallampati: II  TM distance: >3 FB  Neck ROM: full       Cardiovascular - normal exam  Rhythm: regular  Rate: normal  (-) murmur     Dental - normal exam           Pulmonary - normal exam  Breath sounds clear to auscultation     Abdominal    Neurological - normal exam                 Anesthesia Plan    ASA 2       Plan - spinal   Neuraxial block will be primary anesthetic                Postoperative Plan: Postoperative administration of opioids is intended.    Pertinent diagnostic labs and testing reviewed    Informed Consent:    Anesthetic plan and risks discussed with patient.

## 2021-03-30 NOTE — PROGRESS NOTES
EDC - 21 EGA - 39.4    1034 - Pt arrived to labor and delivery for Repeat  Section with Bilateral Salpingectomy. Pt placed in room LDA 5. External monitors in place X2. Category I FHT at this time. VSS. Pt reports good FM. No complaints of contractions, ROM or vaginal bleeding. FOB at bedside. POC discussed with pt and family members, all questions answered. Pt declines  services, FOB translating for pt.  1054 Admission procedures completed.  1100 IV started, labs drawn  1224 Pt ambulated to OR 1 in stable condition at this time  1330 Pt transferred to PACU 2 in stable condition via gurJoliet  1431 Pt transferred to PP in stable condition via rJoliet. Report given to Lucinda LAMA. Infant in NBN

## 2021-03-30 NOTE — OP REPORT
DATE OF SERVICE:  2021     PREOPERATIVE DIAGNOSES:  1.  Intrauterine pregnancy at 39 and 4/7.  2.  Previous  section x2.  3.  Small for gestational age versus intrauterine growth restriction.  4.  Desires permanent sterilization by bilateral salpingectomy.  5.  Group B strep culture negative.     POSTOPERATIVE DIAGNOSES:  1.  Intrauterine pregnancy at 39 and 4/7.  2.  Previous  section x2.  3.  Small for gestational age versus intrauterine growth restriction.  4.  Desires permanent sterilization by bilateral salpingectomy.  5.  Group B strep culture negative.     PROCEDURE PERFORMED:  Repeat low transverse  section with bilateral   salpingectomy.     SURGEON:  Michelle Kelly MD     ASSISTANT:  Gayatri Chaidez MD     ANESTHESIOLOGIST:  Abdiaziz Brownlee MD     ANESTHESIA:  Spinal anesthesia.     COMPLICATIONS:  None.     ESTIMATED BLOOD LOSS:  500 mL.     INTRAOPERATIVE FINDINGS:  A male infant, vertex position, clear amniotic   fluid, Apgars 8 and 8 at one and five minutes respectively.  Weight is pending   at this time.  There are normal uterus, tubes and ovaries bilaterally.  Dense   adhesions and scarring throughout the fascial layer and of the bladder to the   lower uterine segment.     SPECIMENS:  Include bilateral tubes with fimbriated end to pathology for   confirmation.     DESCRIPTION OF PROCEDURE IN DETAIL:  After proper consents were obtained, the   patient was taken to the operating room where spinal anesthesia obtained by   Dr. Brownlee without difficulties.  She was placed in dorsal supine position   with a leftward tilt.  Fetal heart tones were monitored and were reassuring.    Sterile Gomes catheter was placed and she was prepped in normal sterile   fashion.  After the appropriate time interval, she was then draped in normal   sterile fashion and at this time reevaluation of her previous scar.  The scar   was very low down on top of the pubic bone.  Because of the area of the    incision, an intraoperative decision was made to make a second Pfannenstiel   incision in the appropriate location about 2-3 cm above the previous incision.    Then Pfannenstiel skin incision made with a scalpel and carried down to the   underlying fascia with electrocautery.  Fascia was nicked sharply in midline.    Fascial incision extended laterally with Mullen scissors.  Inferior aspect of   the fascial incision, grasped with Kochers, elevated and rectus muscles   dissected off sharply.  Attention was turned to the superior aspect of the   fascial incision, in similar fashion, grasped, elevated and rectus muscle   dissected off.  The rectus muscles are grasped in the midline and    sharply.  Peritoneum was identified and entered sharply and the peritoneal   incision extended inferiorly and superiorly.  Good visualization of bladder.    Bladder blade was inserted.  Vesicouterine peritoneum was tented up and the   bladder was dissected off of the lower uterine segment where there were   significant adhesions and at this time, the lower uterine segment incised in   transverse U-shaped fashion with the scalpel.  Uterine incision was extended   manually.  Membranes were then ruptured with an Allis clamp.  Clear fluid   noted and the infant's head was delivered atraumatically.  Nose and mouth   suctioned with bulb suction and the remainder of the infant delivered   atraumatically.  Approximately 30 seconds after delivery, the cord was doubly   clamped and cut and a pink, vigorous, crying infant handed directly to waiting   nursing team.  At this time, the placenta was delivered via gentle traction   on the cord and exterior uterine massage.  The uterus was then exteriorized   and cleared of all clots and debris with a dry laparotomy sponge and the lower   uterine segment was closed with #1 chromic running locked fashion.  Second   layer of same suture was used to effectively close the uterus in 2-layer    fashion.  Excellent hemostasis was noted.  Verbal consent one last time   obtained from the patient.  She desires permanent sterilization.  Therefore,   the right fallopian tube was identified, followed to the fimbriated end,   grasped with Ariana clamps and using the LigaSure, the mesosalpinx was   clamped, cauterized and transected serially through the mesosalpinx until the   cornual area where the tube was cross-clamped, cauterized and transected and   the entire length of the fallopian tube with fimbriated end was handed off the   field.  The area of salpingectomy was examined and found to be hemostatic and   attention was turned to the left fallopian tube in similar fashion,   identified, followed to the fimbriated end, grasped with Babcocks and removed   with the LigaSure in a similar fashion.  This area was also examined and found   to be hemostatic.  At this time, the uterus was returned to the abdomen where   the gutters were cleared of all clots and debris.  Lower uterine segment   again reinspected and found to be hemostatic.  Both areas of tubal   sterilization identified and were hemostatic.  The bladder was examined, seen   to be below the level of incision.  A Gomes catheter was checked with clear   urine in the Gomes tubing and at this time, the peritoneum and rectus muscles   were closed in a single layer running 3-0 Vicryl suture.  Copious irrigation   was used.  Rectus muscle belly was examined and found to be hemostatic,   intact, and well approximated in the midline.  The fascia was closed with 0   Vicryl in a running fashion.  Manual palpation confirms thorough and adequate   closure of the fascial layer.  Copious irrigation again used.  Hemostasis   noted.  Subcutaneous fatty layer reapproximated with 3-0 Vicryl suture and the   skin was closed with subcuticular 4-0 Vicryl on a Bird needle and a Mepilex   dressing and is applied.  Sponge, lap, needle and instrument counts were   correct  and the patient was taken to recovery room awake and in stable   condition.  Infant was transferred to  nursery with blow-by oxygen.        ______________________________  MD JOSELYN GeeW/MUSA/ELVIA    DD:  2021 13:41  DT:  2021 15:39    Job#:  [id number]

## 2021-03-30 NOTE — OR SURGEON
Immediate Post OP Note    PreOp Diagnosis: IUP @ 39 4/7 weeks, prev c/s X 2, desires sterilization    PostOp Diagnosis: same    Procedure(s):   SECTION, REPEAT, WITH SALPINGECTOMY - Wound Class: Clean Contaminated    Surgeon(s):  RAIZA Ordoñez M.D.    Anesthesiologist/Type of Anesthesia:  Anesthesiologist: Abdiaziz Brownlee M.D./Spinal    Surgical Staff:  Circulator: Amee Farfan R.N.  Scrub Person: Cheryl PICKARD Baby  Nurse: Fatoumata Yang R.N.    Specimens removed if any:  ID Type Source Tests Collected by Time Destination   A : Right Tissue Fallopian Tube PATHOLOGY SPECIMEN Michelle Kelly M.D. 3/30/2021 11:43 AM    B : Left Tissue Fallopian Tube PATHOLOGY SPECIMEN Michelle Kelly M.D. 3/30/2021 11:43 AM        Estimated Blood Loss: 500cc    Findings: Male vertex, clear, 8 and 8, normal uterus, tubes, ovaries, dense adhesions and scarring throughout    Complications: none    Dictated # #[id number]    3/30/2021 1:31 PM Michelle Kelly M.D.

## 2021-03-30 NOTE — H&P
DATE OF ADMISSION:  2021     IDENTIFICATION:  This is a 30-year-old  female  4, para   2-0-1-2, with an estimated date of confinement of 2021.  Estimated   gestational age 39 and 4/7 weeks.     CHIEF COMPLAINT:  The patient presents for scheduled repeat  delivery   and bilateral salpingectomy for sterilization.     HISTORY OF PRESENT ILLNESS:  The patient with prenatal care in our office.    She had a previous pregnancy under the care of Dr. Daniela Hathaway here in the   office.  She was initially in this pregnancy seen by the nurse midwives and   obviously transferred to my care.  She has a history of 2 previous    deliveries.  She had early ultrasound at 7 and 10 weeks that established EDC   of .  She then had a 20-week ultrasound that was about a 1-week lag.  We   have been doing serial growth ultrasounds.  Overall, the growth in its   entirety has begun to lag from a week to 1.5 to 2-2.5 weeks, although if you   look at the measurements, the lag is all in the long bones of the humerus and   the femurs.  The biparietal head circumference and abdominal circumference   have stayed within a week of the estimated EDC is probably a constitutional   SGA infant, but because of the diagnosis of SGA versus IUGR, she has been in   twice weekly monitoring for the past 3 weeks.  Her pregnancy has otherwise   been uncomplicated.  She refused aneuploidy screening.  She had normal   Glucola, had normal labs.  She has been seen by [facility] for cardiac   monitoring and echo with no concerns or recommendations.  This was done   secondary to referral for palpitations.     OBSTETRICAL HISTORY:  Significant for two previous  sections, one in   , one in 2018.     GYNECOLOGIC HISTORY:  Benign with no history of abnormal Paps, sexually   transmitted infection or herpes simplex virus, oral or genital in the patient   or .     PAST MEDICAL HISTORY:  Significant for  headaches and cardiac palpitations.     PAST SURGICAL HISTORY:  Significant for  section x2 and again in 2009   and 2018.     FAMILY HISTORY:  Noncontributory at this time.     SOCIAL HISTORY:  She denies alcohol, tobacco or drug use.  She is  and   sexually monogamous.     REVIEW OF SYSTEMS:  At last office visit, she reports active fetal movement,   no vaginal bleeding, rupture of membranes, or regular uterine contractions.    She denies signs and symptoms of pregnancy-induced hypertension.     PHYSICAL EXAMINATION:  Again, on last office visit:  VITAL SIGNS:  Her weight is 161 pounds, blood pressure 130/76.  Urinalysis   negative.  HEENT:  Within normal limits.  NECK:  Supple, without lymphadenopathy or thyromegaly.  LUNGS:  Clear to auscultation bilaterally.  HEART:  Regular rhythm at this time without murmur, rub or gallop.  ABDOMEN:  Gravid and nontender.  Fetal heart tracing was reactive, reassuring   on NST.  CERVICAL:  Deferred.  EXTREMITIES:  Show no clubbing, cyanosis or edema.  2+ peripheral pulses, 2+   deep tendon reflexes.     LABORATORY DATA:  Of significance, she is Rh positive, rubella immune,   hepatitis B negative, HIV negative, RPR negative.  She had a normal Glucola at   85.  She received Tdap on  and is GBS negative, done on .     DISCUSSION:  Thorough discussion with the patient in the office with a    available concerning two previous  sections, recommendation   to proceed with repeat  section of which she desires.  She has also   expressed her desire for permanent sterilization, on multiple occasions dating   back to the beginning of the pregnancy and has signed consent forms for tubal   sterilization.  Therefore, specific risks, benefits, alternatives of surgical   procedure itself were discussed in detail including the risks of anesthesia,   risk of injury to other intra-abdominal and pelvic organs including but not   limited to bowel,  bladder, blood vessels, ureters and nerves, all requiring   further surgical repair; if delayed diagnosis, possible permanent organ   damage, risk of hemorrhage requiring transfusion.  She verbally consents to   blood transfusion in emergent situation. Risk of bowel injury requiring repair   up to including bowel resection and permanent colostomy, risk of bladder and   ureteral injuries requiring repair up to including permanent catheterization   or reimplantation, risk of nerve damage causing chronic pain syndromes and   loss of limb mobility.  She understands all of these risks are increased with   2 previous  sections.  Postoperative complications including wound   separation, wound dehiscence, wound infection, thromboembolic events, and   pneumonia are all discussed.  Again, she verbalized her desire for permanent   sterilization.  We discussed options for this and I have recommended bilateral   salpingectomy for sterilization and noting that this may potentially   decreased risk of ovarian cancer in the future.  Risks of tubal sterilization   failure, ectopic pregnancy and the inherent grave risks of these were   discussed.  She understands she can no longer caring pregnancy without the use   of in vitro fertilization after salpingectomy.  All their questions were   answered and she would like to proceed as scheduled.     ASSESSMENT:  1.  Intrauterine pregnancy at 39 and 4.  2.  Previous  section x2.  3.  Small for gestational age versus intrauterine growth restriction.  4.  Desires permanent sterilization via bilateral salpingectomy.  5.  Group B strep culture negative.  6.  Has been followed by [facility] with no restrictions or   recommendations at this time for subjective complaints of palpitations.     PLAN:  The patient is admitted, prepared for repeat  section and   bilateral salpingectomy today, 2021 at [facility] in   [facility]  [place].        ______________________________  MD HENRIQUE Gee    DD:  03/30/2021 10:31  DT:  03/30/2021 11:20    Job#:  [id number]

## 2021-03-31 LAB
ERYTHROCYTE [DISTWIDTH] IN BLOOD BY AUTOMATED COUNT: 47.2 FL (ref 35.9–50)
HCT VFR BLD AUTO: 28.3 % (ref 37–47)
HGB BLD-MCNC: 9.4 G/DL (ref 12–16)
MCH RBC QN AUTO: 31.3 PG (ref 27–33)
MCHC RBC AUTO-ENTMCNC: 33.2 G/DL (ref 33.6–35)
MCV RBC AUTO: 94.3 FL (ref 81.4–97.8)
PLATELET # BLD AUTO: 118 K/UL (ref 164–446)
PMV BLD AUTO: 12 FL (ref 9–12.9)
RBC # BLD AUTO: 3 M/UL (ref 4.2–5.4)
WBC # BLD AUTO: 9.3 K/UL (ref 4.8–10.8)

## 2021-03-31 PROCEDURE — 36415 COLL VENOUS BLD VENIPUNCTURE: CPT

## 2021-03-31 PROCEDURE — 700111 HCHG RX REV CODE 636 W/ 250 OVERRIDE (IP): Performed by: ANESTHESIOLOGY

## 2021-03-31 PROCEDURE — 85027 COMPLETE CBC AUTOMATED: CPT

## 2021-03-31 PROCEDURE — 700102 HCHG RX REV CODE 250 W/ 637 OVERRIDE(OP): Performed by: ANESTHESIOLOGY

## 2021-03-31 PROCEDURE — A9270 NON-COVERED ITEM OR SERVICE: HCPCS | Performed by: OBSTETRICS & GYNECOLOGY

## 2021-03-31 PROCEDURE — A9270 NON-COVERED ITEM OR SERVICE: HCPCS | Performed by: ANESTHESIOLOGY

## 2021-03-31 PROCEDURE — 700102 HCHG RX REV CODE 250 W/ 637 OVERRIDE(OP): Performed by: OBSTETRICS & GYNECOLOGY

## 2021-03-31 PROCEDURE — 770002 HCHG ROOM/CARE - OB PRIVATE (112)

## 2021-03-31 RX ORDER — OXYCODONE HYDROCHLORIDE 5 MG/1
5 TABLET ORAL EVERY 4 HOURS PRN
Status: DISCONTINUED | OUTPATIENT
Start: 2021-03-31 | End: 2021-04-02 | Stop reason: HOSPADM

## 2021-03-31 RX ORDER — KETOROLAC TROMETHAMINE 30 MG/ML
30 INJECTION, SOLUTION INTRAMUSCULAR; INTRAVENOUS EVERY 6 HOURS
Status: DISCONTINUED | OUTPATIENT
Start: 2021-03-31 | End: 2021-03-31

## 2021-03-31 RX ORDER — OXYCODONE HYDROCHLORIDE 5 MG/1
10 TABLET ORAL EVERY 4 HOURS PRN
Status: DISCONTINUED | OUTPATIENT
Start: 2021-03-31 | End: 2021-04-02 | Stop reason: HOSPADM

## 2021-03-31 RX ORDER — MORPHINE SULFATE 4 MG/ML
4 INJECTION, SOLUTION INTRAMUSCULAR; INTRAVENOUS
Status: DISCONTINUED | OUTPATIENT
Start: 2021-03-31 | End: 2021-04-02 | Stop reason: HOSPADM

## 2021-03-31 RX ORDER — IBUPROFEN 600 MG/1
600 TABLET ORAL EVERY 6 HOURS PRN
Status: DISCONTINUED | OUTPATIENT
Start: 2021-03-31 | End: 2021-04-02 | Stop reason: HOSPADM

## 2021-03-31 RX ADMIN — OXYCODONE 10 MG: 5 TABLET ORAL at 18:38

## 2021-03-31 RX ADMIN — IBUPROFEN 600 MG: 600 TABLET, FILM COATED ORAL at 18:38

## 2021-03-31 RX ADMIN — KETOROLAC TROMETHAMINE 30 MG: 30 INJECTION, SOLUTION INTRAMUSCULAR; INTRAVENOUS at 07:16

## 2021-03-31 RX ADMIN — DOCUSATE SODIUM 100 MG: 100 CAPSULE, LIQUID FILLED ORAL at 09:46

## 2021-03-31 RX ADMIN — ACETAMINOPHEN 1000 MG: 500 TABLET, FILM COATED ORAL at 09:46

## 2021-03-31 RX ADMIN — ACETAMINOPHEN 1000 MG: 500 TABLET, FILM COATED ORAL at 04:30

## 2021-03-31 RX ADMIN — OXYCODONE 10 MG: 5 TABLET ORAL at 13:00

## 2021-03-31 RX ADMIN — DOCUSATE SODIUM 100 MG: 100 CAPSULE, LIQUID FILLED ORAL at 23:01

## 2021-03-31 RX ADMIN — OXYCODONE 10 MG: 5 TABLET ORAL at 00:20

## 2021-03-31 RX ADMIN — IBUPROFEN 600 MG: 600 TABLET, FILM COATED ORAL at 13:00

## 2021-03-31 RX ADMIN — KETOROLAC TROMETHAMINE 30 MG: 30 INJECTION, SOLUTION INTRAMUSCULAR; INTRAVENOUS at 01:16

## 2021-03-31 RX ADMIN — OXYCODONE 10 MG: 5 TABLET ORAL at 23:01

## 2021-03-31 RX ADMIN — OXYCODONE 10 MG: 5 TABLET ORAL at 04:30

## 2021-03-31 ASSESSMENT — PAIN DESCRIPTION - PAIN TYPE
TYPE: SURGICAL PAIN

## 2021-03-31 ASSESSMENT — PAIN SCALES - GENERAL: PAIN_LEVEL: 1

## 2021-03-31 NOTE — PROGRESS NOTES
Report received from Chapin LAMA @ the change of shift. Assumed care.     @ [id number]: Discussed plan of care especially on managing pain. Assessment done. Encouraged ambulation. Encouraged to call if with needs. Will check at intervals.

## 2021-03-31 NOTE — CARE PLAN
Problem: Altered physiologic condition related to postoperative  delivery  Goal: Patient physiologically stable as evidenced by normal lochia, palpable uterine involution and vital signs within normal limits  Outcome: PROGRESSING AS EXPECTED  Note: VSS. Fundus firm with light lochia. Patient educated on when to pull emergency call light.      Problem: Potential for postpartum infection related to surgical incision, compromised uterine condition, urinary tract or respiratory compromise  Goal: Patient will be afebrile and free from signs and symptoms of infection  Outcome: PROGRESSING AS EXPECTED  Note: Patient remains afebrile. No s/sx of infection at this time. Mepilex silver dressing intact with old drainage outlined.

## 2021-03-31 NOTE — ANESTHESIA POSTPROCEDURE EVALUATION
Patient: Alejandra Reyes Villegas    Procedure Summary     Date: 21 Room / Location: [facility] OR 01 / SURGERY LABOR AND DELIVERY    Anesthesia Start: 1234 Anesthesia Stop: 1335    Procedure:  SECTION, REPEAT, WITH SALPINGECTOMY (Bilateral Abdomen) Diagnosis:        delivery delivered      ( section delivered)    Surgeons: Michelle Kelly M.D. Responsible Provider: Abdiaziz Brownlee M.D.    Anesthesia Type: spinal ASA Status: 2          Final Anesthesia Type: spinal  Last vitals  BP   Blood Pressure: 104/62    Temp   36.6 °C (97.9 °F)    Pulse   78   Resp   17    SpO2   95 %      Anesthesia Post Evaluation    Patient location during evaluation: PACU  Patient participation: complete - patient participated  Level of consciousness: awake and alert  Pain score: 1    Airway patency: patent  Anesthetic complications: no  Cardiovascular status: hemodynamically stable  Respiratory status: acceptable  Hydration status: euvolemic    PONV: none          There were no known complications for this encounter.     Nurse Pain Score: 6 (NPRS)

## 2021-03-31 NOTE — PROGRESS NOTES
Received report from DAINA Jane. Patient in bed sleeping. IV patent running second bag of pitocin. SCD's and pulse ox on and working, schroeder draining to gravity.     1930: Patient assessed, declines pain at this time. Receiving toradol and tylenol, see MAR. Will call If needing stronger medication. POC discussed, Whiteboards updated. Call light within reach. Patient encouraged to call with any needs and or concerns.

## 2021-03-31 NOTE — PROGRESS NOTES
POD # 1 S/P RLTCS and bilateral salpingectomy    Doing well.  Pain moderately well controlled.  Catheter recently D/C'ed- no void yet, ambulating, breast feeding.  Tolerating regular diet.  + Flatus.  Denies heavy bleeding.    Vitals:    03/31/21 0200 03/31/21 0300 03/31/21 0400 03/31/21 0600   BP: 104/62   (!) 93/58   Pulse: 72 82 78 65   Resp: 17 16 17 17   Temp: 36.6 °C (97.9 °F)   36.3 °C (97.3 °F)   TempSrc: Temporal   Temporal   SpO2: 96% 96% 95% 97%   Weight:       Height:           AAO, NAD  Abd:  S/NT/ND; fundus firm; incision dressing c/d/i  Ext:  Calves NT, no edema    Recent Labs     03/30/21  1100 03/31/21  0526   HEMOGLOBIN 13.5 9.4*   HEMATOCRIT 40.2 28.3*   MCV 93.1 94.3   MCH 31.3 31.3   PLATELETCT 156* 118*       Continue post-op and postpartum care.  Anticipate D/C home 2 days +/-

## 2021-04-01 PROCEDURE — A9270 NON-COVERED ITEM OR SERVICE: HCPCS | Performed by: OBSTETRICS & GYNECOLOGY

## 2021-04-01 PROCEDURE — 700102 HCHG RX REV CODE 250 W/ 637 OVERRIDE(OP): Performed by: OBSTETRICS & GYNECOLOGY

## 2021-04-01 PROCEDURE — 770002 HCHG ROOM/CARE - OB PRIVATE (112)

## 2021-04-01 RX ADMIN — IBUPROFEN 600 MG: 600 TABLET, FILM COATED ORAL at 00:21

## 2021-04-01 RX ADMIN — IBUPROFEN 600 MG: 600 TABLET, FILM COATED ORAL at 22:42

## 2021-04-01 RX ADMIN — OXYCODONE 5 MG: 5 TABLET ORAL at 18:38

## 2021-04-01 RX ADMIN — IBUPROFEN 600 MG: 600 TABLET, FILM COATED ORAL at 16:31

## 2021-04-01 RX ADMIN — OXYCODONE 5 MG: 5 TABLET ORAL at 14:21

## 2021-04-01 RX ADMIN — OXYCODONE 10 MG: 5 TABLET ORAL at 04:32

## 2021-04-01 RX ADMIN — OXYCODONE 5 MG: 5 TABLET ORAL at 22:42

## 2021-04-01 RX ADMIN — OXYCODONE 10 MG: 5 TABLET ORAL at 09:05

## 2021-04-01 RX ADMIN — IBUPROFEN 600 MG: 600 TABLET, FILM COATED ORAL at 09:05

## 2021-04-01 RX ADMIN — SIMETHICONE 80 MG: 80 TABLET, CHEWABLE ORAL at 09:05

## 2021-04-01 ASSESSMENT — PAIN DESCRIPTION - PAIN TYPE
TYPE: SURGICAL PAIN

## 2021-04-01 NOTE — PROGRESS NOTES
Received bedside report from DAINA Jane. Patient in bed, reports pain but recently received pain medication. Instructed to wait about 1 hr and then call if needing something stronger. Whiteboards updated, POC discussed. Call light within reach. Patient encouraged to call with any needs and or concerns.

## 2021-04-01 NOTE — CARE PLAN
Problem: Potential for postpartum infection related to surgical incision, compromised uterine condition, urinary tract or respiratory compromise  Goal: Patient will be afebrile and free from signs and symptoms of infection  Outcome: PROGRESSING AS EXPECTED  Note: Patient remains afebrile. No s/sx of infection at this time. Mepilex silver dressing dry and intact. Old drainage noted and outlined.      Problem: Alteration in comfort related to surgical incision and/or after birth pains  Goal: Patient is able to ambulate, care for self and infant with acceptable pain level  Outcome: PROGRESSING AS EXPECTED  Note: Patient ambulating caring for self and infant. Patient reports a lot of pain, taking medications around the clock. Patient states pain is intermittent. IM morphine offered, patient declining at this time.

## 2021-04-01 NOTE — PROGRESS NOTES
POD # 2 S/P RLTCS and salpingectomy    Doing well.  Pain moderately controlled- taking meds but still complaining of pain.  Voiding, ambulating, breast feeding.  Tolerating regular diet.  + Flatus.  Denies heavy bleeding.    Vitals:    03/31/21 1017 03/31/21 1343 03/31/21 1806 04/01/21 0600   BP: 105/66 101/59 137/83 105/65   Pulse: 98 80 93 86   Resp: 16 17 18 17   Temp: 36.9 °C (98.4 °F) 36.8 °C (98.2 °F) 36.8 °C (98.2 °F) 36.5 °C (97.7 °F)   TempSrc: Temporal Temporal Temporal Temporal   SpO2: 96% 95% 99% 94%   Weight:       Height:           AAO, NAD  Abd:  S/NT/ND; fundus firm; incision dressing c/d/i  Ext:  Calves NT, no edema    Recent Labs     03/30/21  1100 03/31/21  0526   HEMOGLOBIN 13.5 9.4*   HEMATOCRIT 40.2 28.3*   MCV 93.1 94.3   MCH 31.3 31.3   PLATELETCT 156* 118*       Continue post-op and postpartum care.  Anticipate D/C home 1-2 days.

## 2021-04-01 NOTE — CARE PLAN
Problem: Alteration in comfort related to surgical incision and/or after birth pains  Goal: Patient is able to ambulate, care for self and infant with acceptable pain level  Outcome: PROGRESSING AS EXPECTED  Note: Pain medication is being given as needed.    Encouraged patient to ambulate in the hallway.     Problem: Potential anxiety related to difficulty adapting to parental role  Goal: Patient will verbalize and demonstrate effective bonding and parenting behavior  Outcome: PROGRESSING AS EXPECTED  Note: She's bonding well with infant.

## 2021-04-01 NOTE — PROGRESS NOTES
"0700:Report received from Chapin LAMA. Assumed Patient care. Patient appears calm and in no acute distress. Labs and orders reviewed.     0840:Assessment completed. Fundus firm, light lochia noted. Mepilex silver dressing in place to incision dressing intact, dry, with old drainage noted.  Patient educated on keeping infant bundled up and/or skin-to-skin, safe sleep policy for infant, and infant feeding frequency, patient verbalizes understanding. Plan of care discussed with patient; questions have been answered at this time. Patient needs have been met at this time. Patient encouraged to call when needing assistance. Call light within reach. Rounding in place. /70   Pulse 91   Temp 36.8 °C (98.2 °F) (Oral)   Resp 18   Ht 1.549 m (5' 1\")   Wt 70.3 kg (155 lb)   SpO2 96%   Breastfeeding Yes   BMI 29.29 kg/m² .   "

## 2021-04-01 NOTE — LACTATION NOTE
This note was copied from a baby's chart.  Mom is a 29 y/o P3 who delivered baby boy weighing & # 6.6 oz at 39.4 wk. Mom breast fed her last child for 18 months and that baby is 2 yrs old. Mom states she had no difficulty breastfeeding with last baby and she exclusively breastmfed. Mom stated she just fed baby and then offered formula but baby did not want the bottle. Mom says she offered bottle because she has no milk and baby was putting hands to mouth. Encouraged mother to place baby back to breast when noting feeding cues and keep baby awake.   Claude # [id number],  used for education however mom did understand LC's questions.  No concerns from mother at this time. Will follow up in morning with lactation as needed. Mom has a pump at home

## 2021-04-01 NOTE — CARE PLAN
Problem: Potential for postpartum infection related to surgical incision, compromised uterine condition, urinary tract or respiratory compromise  Goal: Patient will be afebrile and free from signs and symptoms of infection  Outcome: PROGRESSING AS EXPECTED  Note: Patient exhibits no s/s of infection. Monitoring and assessing patient vital signs. Using standard precaution while providing care to patient.      Problem: Alteration in comfort related to surgical incision and/or after birth pains  Goal: Patient is able to ambulate, care for self and infant with acceptable pain level  Outcome: PROGRESSING AS EXPECTED  Note: Monitoring patient pain level. Patient educated on next availability of PRN pain medication, patient verbalizes understanding. Patient able to ambulate and provide care for self and infant.

## 2021-04-02 VITALS
BODY MASS INDEX: 29.27 KG/M2 | DIASTOLIC BLOOD PRESSURE: 70 MMHG | RESPIRATION RATE: 18 BRPM | TEMPERATURE: 98.4 F | OXYGEN SATURATION: 95 % | HEIGHT: 61 IN | WEIGHT: 155 LBS | SYSTOLIC BLOOD PRESSURE: 107 MMHG | HEART RATE: 87 BPM

## 2021-04-02 PROBLEM — G89.18 POST-OPERATIVE PAIN: Status: ACTIVE | Noted: 2021-04-02

## 2021-04-02 PROBLEM — Z30.2 ENCOUNTER FOR FEMALE STERILIZATION PROCEDURE: Status: ACTIVE | Noted: 2021-04-02

## 2021-04-02 PROCEDURE — A9270 NON-COVERED ITEM OR SERVICE: HCPCS | Performed by: OBSTETRICS & GYNECOLOGY

## 2021-04-02 PROCEDURE — 700102 HCHG RX REV CODE 250 W/ 637 OVERRIDE(OP): Performed by: OBSTETRICS & GYNECOLOGY

## 2021-04-02 RX ORDER — IBUPROFEN 600 MG/1
600 TABLET ORAL EVERY 6 HOURS PRN
Qty: 30 TABLET | Refills: 2 | Status: SHIPPED | OUTPATIENT
Start: 2021-04-02 | End: 2022-09-28

## 2021-04-02 RX ORDER — PSEUDOEPHEDRINE HCL 30 MG
100 TABLET ORAL 2 TIMES DAILY PRN
Qty: 60 CAPSULE | Refills: 1 | Status: SHIPPED | OUTPATIENT
Start: 2021-04-02 | End: 2022-09-28

## 2021-04-02 RX ORDER — OXYCODONE HYDROCHLORIDE 5 MG/1
5 TABLET ORAL EVERY 4 HOURS PRN
Qty: 25 TABLET | Refills: 0 | Status: SHIPPED | OUTPATIENT
Start: 2021-04-02 | End: 2021-04-07

## 2021-04-02 RX ADMIN — DOCUSATE SODIUM 100 MG: 100 CAPSULE, LIQUID FILLED ORAL at 09:00

## 2021-04-02 RX ADMIN — IBUPROFEN 600 MG: 600 TABLET, FILM COATED ORAL at 05:33

## 2021-04-02 RX ADMIN — IBUPROFEN 600 MG: 600 TABLET, FILM COATED ORAL at 11:37

## 2021-04-02 RX ADMIN — OXYCODONE 5 MG: 5 TABLET ORAL at 05:32

## 2021-04-02 RX ADMIN — OXYCODONE 5 MG: 5 TABLET ORAL at 08:59

## 2021-04-02 NOTE — CARE PLAN
Problem: Pain Management  Goal: Pain level will decrease to patient's comfort goal  Outcome: PROGRESSING AS EXPECTED  Note: Pt request pain meds around the clock.      Problem: Altered physiologic condition related to postoperative  delivery  Goal: Patient physiologically stable as evidenced by normal lochia, palpable uterine involution and vital signs within normal limits  Outcome: PROGRESSING AS EXPECTED  Note: Fundus firm and lochia scant.

## 2021-04-02 NOTE — PROGRESS NOTES
Report received from Prosper LAMA. Pt assessment complete. Pt has been feeling pain at her incision when moving around. Pain medication has been reviewed with pt. Encouraged pt to ambulate in room and in hallway. Pt has been breastfeeding baby and pt states no pain or discomfort with that. Will continue postpartum care.

## 2021-04-02 NOTE — PROGRESS NOTES
Assumed care. Assessment completed, VSS. Fundus firm, lochia scant. Pt. ambulating and voiding. Pt. requests pain medication as scheduled.POC discussed in Kyrgyz, all questions answered. Encouraged to call with needs.

## 2021-04-02 NOTE — CARE PLAN
Problem: Alteration in comfort related to surgical incision and/or after birth pains  Goal: Patient verbalizes acceptable pain level  Outcome: PROGRESSING AS EXPECTED  Note: Reviewed 0-10 pain scale and available pain medication with the pt. Pt is taking oxycodone and motrin for pain relief.      Problem: Potential knowledge deficit related to lack of understanding of self and  care  Goal: Patient will demonstrate ability to care for self and infant  Outcome: PROGRESSING AS EXPECTED  Note: Patient demonstrated knowledge in care for herself and infant with feeding, changing diaper, and comforting.

## 2021-04-02 NOTE — DISCHARGE INSTRUCTIONS
DISCHARGE INSTRUCTIONS (Yoruba) POSTPARTUM FOR MOM      ASAD LAS SANDEEP:  · Antes de tocar el bebé.  · Antes del amamantamiento o darle al bebé lactancia artificial.  · Después de usar el baño.    CUIDADO DE LAS HERIDAS:  · La Incisión de la Operación Cesárea:  Mantenga limpea y seca, NO levante nada que pesa más que piedra bebé por 6 semenas.  No debe ninguna apertura ni pus.  · Episiotomía/Wendy Vaginal:  Use un spray de Tucks o Dermoplast, tome un baño de asiento cuando necesite (6 pulgadas), siga usando la botella Mai hasta que pare de sangrar.    CUIDADA DEL SENO:  · Lleve un sostén.  · Si esta` amamantando no use jabón en el seno ni en los pezones.  · Aplique lanolina si los pezones se ponen quebrazados y si le duelen.    CUIDADO DE LA VAGINA:  · [place] puede entrar la vagina por 6 semanas:  Actividad sexual, Ducha vaginal, Tampones.  · El sangramiento puede seguir por 2 a 4 semanas.  La cantidad es variable.  Llame a piedra med`ico(a) obstetra si hay mucha manuel (si usa ma`s de chaya toalla femenina cada hora).    CONTRACEPCIÒN:  · Es posible embarazarse en cualquier tiempo despus del parto y mientras el amamantamiento.  · Debe planear de discutir los metodos de cantracepción con piedra médico(a) cuando vuelva en 6 semanas para piedra revisión médica.    DIETA Y DEFECACÒN:  · Para evitar la constipación coma mas fibra (cereal salvado, fruta, y verduras) Y tome muchos liquidos.   · Es común orinar frecuentemente después del parto.    POSTPARTO Y LA DEPRESÒN:  Santo los primeros bustamante después del parto es común sentirse:  · Impaciente, irritable, o llorar  Estos sentimientos llegan y van rapidamente.  No obstante, sole chaya de cada hedy mujeres tiene síntomas emocionales conocidos ann depresión postparto.  · Despresión Postparto:  Puede empezar tan pronto ann el fernando o tercer día después del parto o puede durar algunas semanas o meses para desarrollar.  Síntomas de la depresión pueden presentarse, jenna son más  intensos:  · Pérdida del hambre  · Frecuencia de llorar  · Sentirse desesperada o perder el control  · Demasiada o no suficiente preocupación con piedra bebé  · Tener miedo de tocar el bebé  · No preocuparse con piedra propia apariencia  · Incapacidad de dormir o dormir excesivamente    DEPRESIÒN / RIESGO AL SUICIDIO:    Cuando se le da de paramjit de alguna entidad de [facility], es importante aprender a mantener a ro de hacerse daño a sí mismo.    Reconocer los signos de advertencia:  · Cambios bruscos en la peronalidad, positiva o negativa, incluyendo aumento de la energia  · Regalar posesiones  · Cambios en patrones de comer - significativos cambios de peso - positivos o negativos  · Cambio de patrones para dormir - no poder dormir o dormir todo el tiempo  · Falta de voluntad o incapacidad de comunicarse  · Depresión  · Sugar, desánimo y tristeza inusual  · Habla de querer morir  · Descuido del aspecto personal  · Rebeldía - comportamiento imprudente  · Retiro de personas y actividades que les gusta  · Confusión-incapacidad para concentrarse    Si usted o un ser querido observa cualquiera de estos comportamientos o tiene preocupaciones de hacerse daño a si mismo, aquí le damos lo que usted puede hacer:  · Hablar de ti - tus sentimientos y razones para hacerse yvon a si mismo  · Retire cualquier medio que se podría utilizar para lastimarse (ejemplos: píldoras, cuerdas, cordones de extensión, arma de kajal)  · Obtenga ayuda profesional de la comunidad (helga mental, abuso de sustancias, orientación psicológica)  · No estar solo: llamar a un contacto seguro - chaya persona que confía en que estará allí por usted  · Llamada local LINEA de CRISIS [phone] y [phone]  · Llamada local [employer] de [place] [phone] or www.Fastnote.com  · Llamada gratuita nacional, líneas de prevención del suicidio  · Preventión del Suicidio Nacional [facility] [phone]  ([phone])  · L[place]nea Nacional de la [place] 800-SUICIDE ([phone])       ([employer])

## 2021-04-02 NOTE — DISCHARGE SUMMARY
Discharge Summary:      Alejandra Reyes Villegas    Admit Date:   3/30/2021  Discharge Date:  2021     Admitting diagnosis:  Labor and delivery, indication for care [O75.9]  Discharge Diagnosis: Status post RLTCS and bilateral salpingectomy.  Pregnancy Complications: none  Tubal Sterilization:  yes        History:  Past Medical History:   Diagnosis Date   • Psychiatric problem     depression     OB History    Para Term  AB Living   4 3 3   1 3   SAB TAB Ectopic Molar Multiple Live Births   1       0 3      # Outcome Date GA Lbr Chris/2nd Weight Sex Delivery Anes PTL Lv   4 Term 21 39w4d  3.362 kg (7 lb 6.6 oz) M CS-LTranv Spinal N MEGGAN   3 Term 18 37w2d  2.715 kg (5 lb 15.8 oz) M CS-LTranv Spinal N MEGGAN   2 SAB            1 Term      CS-Unspec   MEGGAN        Patient has no known allergies.  Patient Active Problem List    Diagnosis Date Noted   • 26 weeks gestation of pregnancy 2020   • Palpitations 2020   • Encounter for other specified  screening 2020   • Multigravida 2020   • Maternal care for unspecified type scar from previous  delivery 2020   • Screening for malignant neoplasm of cervix 2019   • Encounter for surgical aftercare following surgery on the genitourinary system 2018   • H/O:  section 2018   • Migraines 10/03/2018   • Pregnancy examination or test, positive result 2018        Hospital Course:   30 y.o. , now para 3, was admitted with the above mentioned diagnosis, underwent Repeat  repeat,  for repeat. Patient postpartum course was unremarkable, with progressive advancement in diet , ambulation and toleration of oral analgesia. Patient without complaints today and desires discharge.      Vitals:    21 0845 21 1800 21 1840 21 0600   BP: 109/70 145/77 122/80 107/70   Pulse: 91 88  87   Resp: 18 18  18   Temp: 36.8 °C (98.2 °F) 36.8 °C (98.3 °F)  36.9  °C (98.4 °F)   TempSrc: Oral Temporal  Temporal   SpO2: 96% 97%  95%   Weight:       Height:           Current Facility-Administered Medications   Medication Dose   • ibuprofen (MOTRIN) tablet 600 mg  600 mg   • oxyCODONE immediate-release (ROXICODONE) tablet 5 mg  5 mg   • oxyCODONE immediate-release (ROXICODONE) tablet 10 mg  10 mg   • morphine (pf) 4 mg/mL injection 4 mg  4 mg   • oxytocin (PITOCIN) infusion (for postpartum)   mL/hr   • LR infusion     • miSOPROStol (CYTOTEC) tablet 800 mcg  800 mcg   • docusate sodium (COLACE) capsule 100 mg  100 mg   • simethicone (MYLICON) chewable tab 80 mg  80 mg       Exam:  Breast Exam: deferred  Abdomen: Abdomen soft, non-tender. BS normal. No masses,  No organomegaly  Fundus Non Tender: yes  Incision: dressing clean and dry       Labs:  Recent Labs     03/30/21  1100 03/31/21  0526   WBC 8.4 9.3   RBC 4.32 3.00*   HEMOGLOBIN 13.5 9.4*   HEMATOCRIT 40.2 28.3*   MCV 93.1 94.3   MCH 31.3 31.3   MCHC 33.6 33.2*   RDW 46.3 47.2   PLATELETCT 156* 118*   MPV 12.2 12.0        Activity:   Discharge to home  Pelvic Rest x 6 weeks    Assessment:  normal postpartum course  Discharge Assessment: stable and progressing well     Follow up: 2 weeks with Dr Kelly for incision check      Discharge Meds:   No current outpatient medications on file.       Michelle Kelly M.D.

## 2021-04-02 NOTE — LACTATION NOTE
This note was copied from a baby's chart.  Follow up LC visit. Mom resting in beds with baby in bassinet. FOB at bedside helping with translation. Mom reports baby is feeding well and she is hearing swallows. Baby stools are green. Mom denies any discomfort at breasts. Mom does have a pump at home for use if needed. Parents encouraged to call WIC office now to set up follow up postpartum appointment. No concerns from mother

## 2022-03-21 ENCOUNTER — OFFICE VISIT (OUTPATIENT)
Dept: MEDICAL GROUP | Facility: CLINIC | Age: 32
End: 2022-03-21
Payer: COMMERCIAL

## 2022-03-21 ENCOUNTER — APPOINTMENT (OUTPATIENT)
Dept: RADIOLOGY | Facility: CLINIC | Age: 32
End: 2022-03-21
Attending: STUDENT IN AN ORGANIZED HEALTH CARE EDUCATION/TRAINING PROGRAM
Payer: COMMERCIAL

## 2022-03-21 VITALS
BODY MASS INDEX: 27.75 KG/M2 | WEIGHT: 147 LBS | TEMPERATURE: 97.8 F | DIASTOLIC BLOOD PRESSURE: 78 MMHG | HEIGHT: 61 IN | HEART RATE: 70 BPM | SYSTOLIC BLOOD PRESSURE: 118 MMHG | OXYGEN SATURATION: 96 %

## 2022-03-21 DIAGNOSIS — R53.83 FATIGUE, UNSPECIFIED TYPE: ICD-10-CM

## 2022-03-21 DIAGNOSIS — G43.919 INTRACTABLE MIGRAINE WITHOUT STATUS MIGRAINOSUS, UNSPECIFIED MIGRAINE TYPE: ICD-10-CM

## 2022-03-21 DIAGNOSIS — R20.2 PARESTHESIA OF LEFT LOWER EXTREMITY: ICD-10-CM

## 2022-03-21 PROCEDURE — 99203 OFFICE O/P NEW LOW 30 MIN: CPT | Mod: GE | Performed by: STUDENT IN AN ORGANIZED HEALTH CARE EDUCATION/TRAINING PROGRAM

## 2022-03-21 ASSESSMENT — PATIENT HEALTH QUESTIONNAIRE - PHQ9
SUM OF ALL RESPONSES TO PHQ QUESTIONS 1-9: 17
5. POOR APPETITE OR OVEREATING: 3 - NEARLY EVERY DAY
CLINICAL INTERPRETATION OF PHQ2 SCORE: 2

## 2022-03-21 NOTE — PROGRESS NOTES
CC:  Diagnoses of Fatigue, unspecified type, Paresthesia of left lower extremity, and Intractable migraine without status migrainosus, unspecified migraine type were pertinent to this visit.    HISTORY OF THE PRESENT ILLNESS: Patient is a 31 y.o. female. This pleasant patient is here today to establish with her  and child.   Due for pap this year.      1, 3, 12    H/o post postam depression, same as when       Problem   Fatigue    Unclear etiology, do not suspect LEILA  H/o depresion     Paresthesia of Left Lower Extremity   Migraines       Allergies: Patient has no known allergies.    Current Outpatient Medications Ordered in Epic   Medication Sig Dispense Refill   • ibuprofen (MOTRIN) 600 MG Tab Take 1 tablet by mouth every 6 hours as needed (For cramping after delivery; do not give if patient is receiving ketorolac (Toradol)). 30 tablet 2   • docusate sodium 100 MG Cap Take 100 mg by mouth 2 times a day as needed for Constipation. (Patient not taking: Reported on 3/21/2022) 60 capsule 1   • Multiple Vitamins-Minerals (MULTIVITAMIN ADULT PO) Take  by mouth. (Patient not taking: Reported on 3/21/2022)       No current [facility]-ordered facility-administered medications on file.       Past Medical History:   Diagnosis Date   • Psychiatric problem     depression       Patient Care Team              Pcp Pt States None PCP - General, Family Medicine            Past Surgical History:   Procedure Laterality Date   • REPEAT C SECTOIN WITH SALPINGECTOMY Bilateral 3/30/2021    Procedure:  SECTION, REPEAT, WITH SALPINGECTOMY;  Surgeon: Michelle Kelly M.D.;  Location: [facility];  Service: Labor and Delivery   • REPEAT C SECTION  2018    Procedure: REPEAT C SECTION;  Surgeon: Jenna Toscano M.D.;  Location: LABOR AND DELIVERY;  Service: Labor and Delivery   • GYN SURGERY      C/S       Social History     Tobacco Use   • Smoking status: Never Smoker   • Smokeless tobacco:  "Never Used   Vaping Use   • Vaping Use: Never used   Substance Use Topics   • Alcohol use: Not Currently   • Drug use: No       Social History     Social History Narrative   • Not on file       No family history on file.  Gen: no fever/chills, headaches do not wake her up at night, worse in AM  CV: No chest pain  Resp: No SOB  GI/: No bowel or bladder complaints, no saddle anesthesia, no incontence  Psych: No depression  MSK: denies focal weakness         Exam: /78 (BP Location: Right arm)   Pulse 70   Temp 36.6 °C (97.8 °F)   Ht 1.549 m (5' 1\")   Wt 66.7 kg (147 lb)   SpO2 96%  Body mass index is 27.78 kg/m².    General: Normal appearing. No distress.  HEENT: Normocephalic. Eyes conjunctiva clear lids without ptosis, pupils equal and reactive to light accommodation, ears normal shape and contour, canals are clear bilaterally, tympanic membranes are benign, nasal mucosa benign, oropharynx is without erythema, edema or exudates.   Pulmonary: Clear to ausculation.  Normal effort. No rales, ronchi, or wheezing.  Cardiovascular: Regular rate and rhythm without murmur.   Neurologic: Grossly nonfocal, CN I-XII intact, 5/5 all groups, endorses full leg change in sensation, feels that leg more (entire leg starting from hip, to tips of toes)  Skin: Warm and dry.  No obvious lesions.  Musculoskeletal: Normal gait.  Psych: Normal mood and affect. Alert and oriented x3. Judgment and insight is normal.    Assessment/Plan  Problem List Items Addressed This Visit     Migraines     Likely tension and migraine, worsening.  Basic labs and RTC to discuss further          Fatigue     Will do basic labs and see if there is an underlying medical condition causing  As was my first time meeting patient, when returns will discuss mood further  RTC after labs         Relevant Orders    CBC WITH DIFFERENTIAL    Comp Metabolic Panel    TSH WITH REFLEX TO FT4    Paresthesia of left lower extremity    Relevant Orders    " DX-HIP-BILATERAL-WITH PELVIS-3/4 VIEWS

## 2022-03-30 PROBLEM — R20.2 PARESTHESIA OF LEFT LOWER EXTREMITY: Status: ACTIVE | Noted: 2022-03-30

## 2022-03-30 PROBLEM — R53.83 FATIGUE: Status: ACTIVE | Noted: 2022-03-30

## 2022-03-30 NOTE — ASSESSMENT & PLAN NOTE
Will do basic labs and see if there is an underlying medical condition causing  As was my first time meeting patient, when returns will discuss mood further  RTC after labs

## 2022-09-28 ENCOUNTER — OFFICE VISIT (OUTPATIENT)
Dept: MEDICAL GROUP | Facility: CLINIC | Age: 32
End: 2022-09-28
Payer: COMMERCIAL

## 2022-09-28 VITALS
RESPIRATION RATE: 16 BRPM | OXYGEN SATURATION: 96 % | DIASTOLIC BLOOD PRESSURE: 82 MMHG | SYSTOLIC BLOOD PRESSURE: 118 MMHG | HEIGHT: 59 IN | HEART RATE: 78 BPM | WEIGHT: 145 LBS | BODY MASS INDEX: 29.23 KG/M2

## 2022-09-28 DIAGNOSIS — Z82.49 FAMILY HISTORY OF EARLY CAD: ICD-10-CM

## 2022-09-28 DIAGNOSIS — G43.919 INTRACTABLE MIGRAINE WITHOUT STATUS MIGRAINOSUS, UNSPECIFIED MIGRAINE TYPE: ICD-10-CM

## 2022-09-28 PROCEDURE — 99213 OFFICE O/P EST LOW 20 MIN: CPT | Mod: GE | Performed by: STUDENT IN AN ORGANIZED HEALTH CARE EDUCATION/TRAINING PROGRAM

## 2022-09-28 RX ORDER — SUMATRIPTAN 25 MG/1
25 TABLET, FILM COATED ORAL
Qty: 10 TABLET | Refills: 3 | Status: SHIPPED | OUTPATIENT
Start: 2022-09-28 | End: 2022-10-14

## 2022-09-28 RX ORDER — INDOMETHACIN 50 MG/1
50 CAPSULE ORAL 3 TIMES DAILY
Qty: 90 CAPSULE | Refills: 0 | Status: SHIPPED | OUTPATIENT
Start: 2022-09-28 | End: 2022-10-14

## 2022-09-28 NOTE — PROGRESS NOTES
"HPI  Alejandra Reyes Villegas is a 32 y.o. female presenting for evaluation of chronic headache.    Problem   Migraines    Years that she has had migraines. Worsening lately. Was seen in [place] previously, and was on medicine, but stopped helping, and she moved to [place], now has no medicine  Headache is constant, fluctuates in severity. Always unilateral on the L side. Exercises, but can only do minimal, and cannot bend over due to throbbing pressure in head. Photophobia and phonophobia, nausea.   Takes excedrin with only minimal help, two can take the edge off but pain remains. Only improves when she relaxes and rests in a dark room.              PMH   Past Medical History:   Diagnosis Date    Psychiatric problem     depression       Past Surgical History:   Procedure Laterality Date    REPEAT C SECTOIN WITH SALPINGECTOMY Bilateral 3/30/2021    Procedure:  SECTION, REPEAT, WITH SALPINGECTOMY;  Surgeon: Michelle Kelly M.D.;  Location: [facility];  Service: Labor and Delivery    REPEAT C SECTION  2018    Procedure: REPEAT C SECTION;  Surgeon: Jenna Toscano M.D.;  Location: LABOR AND DELIVERY;  Service: Labor and Delivery    GYN SURGERY      C/S       Social History     Tobacco Use    Smoking status: Never    Smokeless tobacco: Never   Substance Use Topics    Alcohol use: Not Currently       No family history on file.      PE  /82 (BP Location: Right arm, Patient Position: Sitting, BP Cuff Size: Adult)   Pulse 78   Resp 16   Ht 1.5 m (4' 11.06\")   Wt 65.8 kg (145 lb)   SpO2 96%   BMI 29.23 kg/m²     Physical Exam  Constitutional:       Appearance: Normal appearance.   HENT:      Head: Normocephalic and atraumatic.      Nose: Nose normal.      Mouth/Throat:      Mouth: Mucous membranes are moist.   Eyes:      Extraocular Movements: Extraocular movements intact.      Conjunctiva/sclera: Conjunctivae normal.   Cardiovascular:      Rate and Rhythm: Normal rate and " regular rhythm.   Pulmonary:      Effort: Pulmonary effort is normal. No respiratory distress.   Abdominal:      General: Abdomen is flat.   Musculoskeletal:         General: Normal range of motion.      Cervical back: Normal range of motion and neck supple.   Skin:     General: Skin is warm and dry.   Neurological:      General: No focal deficit present.      Mental Status: She is alert and oriented to person, place, and time. Mental status is at baseline.   Psychiatric:         Mood and Affect: Mood normal.         Behavior: Behavior normal.        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 If indomethacin is not helpful, trial baseline migraine treatments

## 2022-09-28 NOTE — ASSESSMENT & PLAN NOTE
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 If indomethacin is not helpful, trial baseline migraine treatments

## 2022-09-28 NOTE — PATIENT INSTRUCTIONS
Comience con indometacina taylor veces al día, si los síntomas mejoran, puede disminuir la dosis a dos veces al día o chaya vez al día. Si los síntomas empeoran, puede aumentar la dosis nuevamente. No tome más de taylor veces al día.    [place] sumatriptán solo chaya vez al día si es necesario para el dolor de marge intenso.

## 2022-09-30 NOTE — PROGRESS NOTES
Patient states that he has not checked his BP but he has been put on a strict diet from his wife, he is down 10 lbs now. He states that he is seeing Dr. Brizuela and after that he will call to schedule a CPE.    Report recvd at bedside from Lazara LAMA. Pt laying in bed watching tv. Call light within reach. Will continue to monitor.

## 2022-10-12 ENCOUNTER — HOSPITAL ENCOUNTER (OUTPATIENT)
Dept: LAB | Facility: MEDICAL CENTER | Age: 32
End: 2022-10-12
Attending: STUDENT IN AN ORGANIZED HEALTH CARE EDUCATION/TRAINING PROGRAM
Payer: COMMERCIAL

## 2022-10-12 DIAGNOSIS — R53.83 FATIGUE, UNSPECIFIED TYPE: ICD-10-CM

## 2022-10-12 LAB
ALBUMIN SERPL BCP-MCNC: 4.7 G/DL (ref 3.2–4.9)
ALBUMIN/GLOB SERPL: 1.5 G/DL
ALP SERPL-CCNC: 83 U/L (ref 30–99)
ALT SERPL-CCNC: 10 U/L (ref 2–50)
ANION GAP SERPL CALC-SCNC: 10 MMOL/L (ref 7–16)
AST SERPL-CCNC: 12 U/L (ref 12–45)
BASOPHILS # BLD AUTO: 0.7 % (ref 0–1.8)
BASOPHILS # BLD: 0.05 K/UL (ref 0–0.12)
BILIRUB SERPL-MCNC: 0.8 MG/DL (ref 0.1–1.5)
BUN SERPL-MCNC: 9 MG/DL (ref 8–22)
CALCIUM SERPL-MCNC: 9.2 MG/DL (ref 8.5–10.5)
CHLORIDE SERPL-SCNC: 105 MMOL/L (ref 96–112)
CO2 SERPL-SCNC: 24 MMOL/L (ref 20–33)
CREAT SERPL-MCNC: 0.5 MG/DL (ref 0.5–1.4)
EOSINOPHIL # BLD AUTO: 0.08 K/UL (ref 0–0.51)
EOSINOPHIL NFR BLD: 1.1 % (ref 0–6.9)
ERYTHROCYTE [DISTWIDTH] IN BLOOD BY AUTOMATED COUNT: 41.1 FL (ref 35.9–50)
GFR SERPLBLD CREATININE-BSD FMLA CKD-EPI: 128 ML/MIN/1.73 M 2
GLOBULIN SER CALC-MCNC: 3.2 G/DL (ref 1.9–3.5)
GLUCOSE SERPL-MCNC: 85 MG/DL (ref 65–99)
HCT VFR BLD AUTO: 40.2 % (ref 37–47)
HGB BLD-MCNC: 12.9 G/DL (ref 12–16)
IMM GRANULOCYTES # BLD AUTO: 0.01 K/UL (ref 0–0.11)
IMM GRANULOCYTES NFR BLD AUTO: 0.1 % (ref 0–0.9)
LYMPHOCYTES # BLD AUTO: 2.69 K/UL (ref 1–4.8)
LYMPHOCYTES NFR BLD: 36.8 % (ref 22–41)
MCH RBC QN AUTO: 28.8 PG (ref 27–33)
MCHC RBC AUTO-ENTMCNC: 32.1 G/DL (ref 33.6–35)
MCV RBC AUTO: 89.7 FL (ref 81.4–97.8)
MONOCYTES # BLD AUTO: 0.41 K/UL (ref 0–0.85)
MONOCYTES NFR BLD AUTO: 5.6 % (ref 0–13.4)
NEUTROPHILS # BLD AUTO: 4.06 K/UL (ref 2–7.15)
NEUTROPHILS NFR BLD: 55.7 % (ref 44–72)
NRBC # BLD AUTO: 0 K/UL
NRBC BLD-RTO: 0 /100 WBC
PLATELET # BLD AUTO: 286 K/UL (ref 164–446)
PMV BLD AUTO: 11.4 FL (ref 9–12.9)
POTASSIUM SERPL-SCNC: 4 MMOL/L (ref 3.6–5.5)
PROT SERPL-MCNC: 7.9 G/DL (ref 6–8.2)
RBC # BLD AUTO: 4.48 M/UL (ref 4.2–5.4)
SODIUM SERPL-SCNC: 139 MMOL/L (ref 135–145)
TSH SERPL DL<=0.005 MIU/L-ACNC: 3.72 UIU/ML (ref 0.38–5.33)
WBC # BLD AUTO: 7.3 K/UL (ref 4.8–10.8)

## 2022-10-12 PROCEDURE — 80053 COMPREHEN METABOLIC PANEL: CPT

## 2022-10-12 PROCEDURE — 36415 COLL VENOUS BLD VENIPUNCTURE: CPT

## 2022-10-12 PROCEDURE — 84443 ASSAY THYROID STIM HORMONE: CPT

## 2022-10-12 PROCEDURE — 85025 COMPLETE CBC W/AUTO DIFF WBC: CPT

## 2022-10-14 ENCOUNTER — OFFICE VISIT (OUTPATIENT)
Dept: MEDICAL GROUP | Facility: CLINIC | Age: 32
End: 2022-10-14
Payer: COMMERCIAL

## 2022-10-14 VITALS
SYSTOLIC BLOOD PRESSURE: 124 MMHG | DIASTOLIC BLOOD PRESSURE: 77 MMHG | RESPIRATION RATE: 16 BRPM | HEART RATE: 82 BPM | HEIGHT: 59 IN | OXYGEN SATURATION: 96 % | BODY MASS INDEX: 30.24 KG/M2 | WEIGHT: 150 LBS

## 2022-10-14 DIAGNOSIS — G43.919 INTRACTABLE MIGRAINE WITHOUT STATUS MIGRAINOSUS, UNSPECIFIED MIGRAINE TYPE: ICD-10-CM

## 2022-10-14 PROCEDURE — 99213 OFFICE O/P EST LOW 20 MIN: CPT | Mod: GC | Performed by: STUDENT IN AN ORGANIZED HEALTH CARE EDUCATION/TRAINING PROGRAM

## 2022-10-14 RX ORDER — AMITRIPTYLINE HYDROCHLORIDE 10 MG/1
10 TABLET, FILM COATED ORAL NIGHTLY
Qty: 30 TABLET | Refills: 0 | Status: SHIPPED | OUTPATIENT
Start: 2022-10-14 | End: 2022-11-08 | Stop reason: SDUPTHER

## 2022-10-14 RX ORDER — NARATRIPTAN 1 MG/1
1 TABLET ORAL
Qty: 30 TABLET | Refills: 1 | Status: SHIPPED | OUTPATIENT
Start: 2022-10-14 | End: 2022-12-12 | Stop reason: SDUPTHER

## 2022-10-14 ASSESSMENT — FIBROSIS 4 INDEX: FIB4 SCORE: 0.41

## 2022-10-14 NOTE — PROGRESS NOTES
HPI  Alejandra Reyes Villegas is a 32 y.o. female presenting for followup migraines.    Problem   Migraines    Years that she has had migraines. Worsening lately. Was seen in [place] previously, and was on medicine, but stopped helping, and [place] moved to [place], now has no medicine  Headache is constant, fluctuates in severity. Always unilateral on the L side. Exercises, but can only do minimal, and cannot bend over due to throbbing pressure in head. Photophobia and phonophobia, nausea.   Takes excedrin with only minimal help, two can take the edge off but pain remains. Only improves when she relaxes and rests in a dark room.    Update: Trial of indomethacin three times a day with sumatriptan for breakthrough pain.  Only took sumatriptan twice, it did help break the headache but did not like taking this as she had significant numbness and tingling in her jaw when she took it. Did not tolerate these side effects. Feels as though she had minor improvement, but nothing significant with this regimen. Still having constant headache with photophobia, phonophobia.              PMH   Past Medical History:   Diagnosis Date    Psychiatric problem     depression       Past Surgical History:   Procedure Laterality Date    REPEAT C SECTOIN WITH SALPINGECTOMY Bilateral 3/30/2021    Procedure:  SECTION, REPEAT, WITH SALPINGECTOMY;  Surgeon: Michelle Kelly M.D.;  Location: [facility];  Service: Labor and Delivery    REPEAT C SECTION  2018    Procedure: REPEAT C SECTION;  Surgeon: Jenna Toscano M.D.;  Location: LABOR AND DELIVERY;  Service: Labor and Delivery    GYN SURGERY      C/S       Social History     Tobacco Use    Smoking status: Never    Smokeless tobacco: Never   Substance Use Topics    Alcohol use: Not Currently       History reviewed. No pertinent family history.      PE  /77 (BP Location: Right arm, Patient Position: Sitting, BP Cuff Size: Adult)   Pulse 82   Resp 16   " Ht 1.5 m (4' 11.06\")   Wt 68 kg (150 lb)   SpO2 96%   BMI 30.24 kg/m²     Physical Exam  Constitutional:       Appearance: Normal appearance.   HENT:      Head: Normocephalic and atraumatic.      Nose: Nose normal.      Mouth/Throat:      Mouth: Mucous membranes are moist.   Eyes:      Extraocular Movements: Extraocular movements intact.      Conjunctiva/sclera: Conjunctivae normal.   Pulmonary:      Effort: Pulmonary effort is normal. No respiratory distress.   Abdominal:      General: Abdomen is flat.   Musculoskeletal:         General: Normal range of motion.      Cervical back: Normal range of motion and neck supple.   Skin:     General: Skin is warm and dry.   Neurological:      General: No focal deficit present.      Mental Status: She is alert and oriented to person, place, and time. Mental status is at baseline.   Psychiatric:         Mood and Affect: Mood normal.         Behavior: Behavior normal.        A/P:  Migraines  Will stop indomethacin as this has not been helpful overall  Trial amitriptyline 10mg nightly  RTC in 1 month  Attempt transition to naratriptan for severe breakthrough headaches to see if this has fewer side effects for her.    "

## 2022-10-14 NOTE — ASSESSMENT & PLAN NOTE
Will stop indomethacin as this has not been helpful overall  Trial amitriptyline 10mg nightly  RTC in 1 month  Attempt transition to naratriptan for severe breakthrough headaches to see if this has fewer side effects for her.

## 2022-11-08 ENCOUNTER — OFFICE VISIT (OUTPATIENT)
Dept: MEDICAL GROUP | Facility: CLINIC | Age: 32
End: 2022-11-08
Payer: COMMERCIAL

## 2022-11-08 VITALS
BODY MASS INDEX: 29.07 KG/M2 | WEIGHT: 144.2 LBS | DIASTOLIC BLOOD PRESSURE: 87 MMHG | SYSTOLIC BLOOD PRESSURE: 119 MMHG | HEIGHT: 59 IN | RESPIRATION RATE: 12 BRPM | HEART RATE: 79 BPM | OXYGEN SATURATION: 96 %

## 2022-11-08 DIAGNOSIS — G43.919 INTRACTABLE MIGRAINE WITHOUT STATUS MIGRAINOSUS, UNSPECIFIED MIGRAINE TYPE: ICD-10-CM

## 2022-11-08 PROBLEM — Z30.2 ENCOUNTER FOR FEMALE STERILIZATION PROCEDURE: Status: RESOLVED | Noted: 2021-04-02 | Resolved: 2022-11-08

## 2022-11-08 PROBLEM — G89.18 POST-OPERATIVE PAIN: Status: RESOLVED | Noted: 2021-04-02 | Resolved: 2022-11-08

## 2022-11-08 PROBLEM — O34.219 MATERNAL CARE FOR UNSPECIFIED TYPE SCAR FROM PREVIOUS CESAREAN DELIVERY: Status: RESOLVED | Noted: 2020-09-08 | Resolved: 2022-11-08

## 2022-11-08 PROBLEM — Z48.816 ENCOUNTER FOR SURGICAL AFTERCARE FOLLOWING SURGERY ON THE GENITOURINARY SYSTEM: Status: RESOLVED | Noted: 2018-12-12 | Resolved: 2022-11-08

## 2022-11-08 PROBLEM — Z64.1 MULTIGRAVIDA: Status: RESOLVED | Noted: 2020-12-02 | Resolved: 2022-11-08

## 2022-11-08 PROBLEM — Z3A.26 26 WEEKS GESTATION OF PREGNANCY: Status: RESOLVED | Noted: 2020-12-29 | Resolved: 2022-11-08

## 2022-11-08 PROBLEM — Z12.4 SCREENING FOR MALIGNANT NEOPLASM OF CERVIX: Status: RESOLVED | Noted: 2019-02-11 | Resolved: 2022-11-08

## 2022-11-08 PROBLEM — Z32.01 PREGNANCY EXAMINATION OR TEST, POSITIVE RESULT: Status: RESOLVED | Noted: 2018-08-28 | Resolved: 2022-11-08

## 2022-11-08 PROBLEM — Z36.89 ENCOUNTER FOR OTHER SPECIFIED ANTENATAL SCREENING: Status: RESOLVED | Noted: 2020-12-07 | Resolved: 2022-11-08

## 2022-11-08 PROCEDURE — 99213 OFFICE O/P EST LOW 20 MIN: CPT | Mod: GE | Performed by: STUDENT IN AN ORGANIZED HEALTH CARE EDUCATION/TRAINING PROGRAM

## 2022-11-08 RX ORDER — AMITRIPTYLINE HYDROCHLORIDE 10 MG/1
20 TABLET, FILM COATED ORAL NIGHTLY
Qty: 60 TABLET | Refills: 0 | Status: SHIPPED | OUTPATIENT
Start: 2022-11-08 | End: 2022-12-13 | Stop reason: SDUPTHER

## 2022-11-08 ASSESSMENT — FIBROSIS 4 INDEX: FIB4 SCORE: 0.41

## 2022-11-08 NOTE — PROGRESS NOTES
"HPI  Alejandra Reyes Villegas is a 32 y.o. female presenting for followup migraines.    Problem   Migraines    Trial of amitriptyline somewhat successful. She reports complete resolution of morning symptoms and is much more comfortable throughout most of the day, but by the afternoon headaches recur.  Denies side effects from amitriptyline.      3 separate incidents required naratriptan, this resolved her headache effectively.  Was associated with poor taste and nausea, but these side effects were much better than those with sumatriptan.              PMH   Past Medical History:   Diagnosis Date    Psychiatric problem     depression       Past Surgical History:   Procedure Laterality Date    REPEAT C SECTOIN WITH SALPINGECTOMY Bilateral 3/30/2021    Procedure:  SECTION, REPEAT, WITH SALPINGECTOMY;  Surgeon: Michelle Kelly M.D.;  Location: [facility];  Service: Labor and Delivery    REPEAT C SECTION  2018    Procedure: REPEAT C SECTION;  Surgeon: Jenna Toscano M.D.;  Location: LABOR AND DELIVERY;  Service: Labor and Delivery    GYN SURGERY      C/S       Social History     Tobacco Use    Smoking status: Never    Smokeless tobacco: Never   Substance Use Topics    Alcohol use: Not Currently       No family history on file.      PE  /87 (BP Location: Left arm, Patient Position: Sitting, BP Cuff Size: Adult)   Pulse 79   Resp 12   Ht 1.5 m (4' 11.06\")   Wt 65.4 kg (144 lb 3.2 oz)   SpO2 96%   BMI 29.07 kg/m²     Physical Exam  Constitutional:       Appearance: Normal appearance.   HENT:      Head: Normocephalic and atraumatic.      Nose: Nose normal.      Mouth/Throat:      Mouth: Mucous membranes are moist.   Eyes:      Extraocular Movements: Extraocular movements intact.      Conjunctiva/sclera: Conjunctivae normal.   Cardiovascular:      Rate and Rhythm: Normal rate and regular rhythm.   Abdominal:      General: Abdomen is flat.   Musculoskeletal:         " General: Normal range of motion.      Cervical back: Normal range of motion and neck supple.   Skin:     General: Skin is warm and dry.   Neurological:      General: No focal deficit present.      Mental Status: She is alert and oriented to person, place, and time. Mental status is at baseline.   Psychiatric:         Mood and Affect: Mood normal.         Behavior: Behavior normal.        A/P:  Migraines  Improving with amitriptyline, not completely resolved  Open to increased dose  Trial 20mg nightly, if this is ineffective at reducing afternoon headaches can try 10mg in am and 10mg in pm, but recommend 2 week trial of 20mg nightly before trying this adjustment  Continue Naratriptan PRN for severe migraines  Labs reviewed, WNL  RTC in 1 month to followup efficacy.

## 2022-11-08 NOTE — ASSESSMENT & PLAN NOTE
Improving with amitriptyline, not completely resolved  Open to increased dose  Trial 20mg nightly, if this is ineffective at reducing afternoon headaches can try 10mg in am and 10mg in pm, but recommend 2 week trial of 20mg nightly before trying this adjustment  Continue Naratriptan PRN for severe migraines  Labs reviewed, WNL  RTC in 1 month to followup efficacy.

## 2022-12-07 ENCOUNTER — OFFICE VISIT (OUTPATIENT)
Dept: MEDICAL GROUP | Facility: CLINIC | Age: 32
End: 2022-12-07
Payer: COMMERCIAL

## 2022-12-07 VITALS
DIASTOLIC BLOOD PRESSURE: 72 MMHG | SYSTOLIC BLOOD PRESSURE: 103 MMHG | HEART RATE: 84 BPM | RESPIRATION RATE: 16 BRPM | OXYGEN SATURATION: 96 %

## 2022-12-07 DIAGNOSIS — G43.919 INTRACTABLE MIGRAINE WITHOUT STATUS MIGRAINOSUS, UNSPECIFIED MIGRAINE TYPE: ICD-10-CM

## 2022-12-07 PROCEDURE — 99213 OFFICE O/P EST LOW 20 MIN: CPT | Mod: GE

## 2022-12-07 NOTE — PROGRESS NOTES
Subjective:     CC:   Chief Complaint   Patient presents with    Headache     HPI:   Adri presents today for f/u for headaches. She is on naratriptan and amitriptyline. Pt is Scottish speaking and video interpretor was used during this visit.    Problem   Migraines    12/7/22 CB:   Pt has been taking amitriptyline which is helping, but has not used naratriptan.  During exercise, blurry vision and Left sided parasthesia and pain, numbness and tingling in fingers and toes, buring sensation throughout the left side. These symptoms occur every time she works out and during intercourse. The symptoms resolve immediately upon stopping the activity  11/8/22 TG: Trial of amitriptyline somewhat successful. She reports complete resolution of morning symptoms and is much more comfortable throughout most of the day, but by the afternoon headaches recur.  Denies side effects from amitriptyline.      3 separate incidents required naratriptan, this resolved her headache effectively.  Was associated with poor taste and nausea, but these side effects were much better than those with sumatriptan.         Current Outpatient Medications Ordered in Epic   Medication Sig Dispense Refill    amitriptyline (ELAVIL) 10 MG Tab Take 2 Tablets by mouth every evening. 60 Tablet 0    Naratriptan HCl 1 MG Tab Take 1 Tablet by mouth 1 time a day as needed (Migraine). 30 Tablet 1     No current Epic-ordered facility-administered medications on file.       ROS:  Negative except for above in HPI    Objective:     Exam:  /72 (BP Location: Left arm, Patient Position: Sitting, BP Cuff Size: Adult)   Pulse 84   Resp 16   SpO2 96%  There is no height or weight on file to calculate BMI.    Gen: Alert and oriented, No apparent distress.  HEENT: NCAT, MMM, No lymphadenopathy  Lungs: Normal effort, CTA bilaterally, no wheezes, rhonchi, or rales  CV: Regular rate and rhythm. No murmurs, rubs, or gallops. Radial pulses palpable bilat  Abd: Soft,  non-distended, no guarding, no rebound, non-tender to palpation  Ext: No clubbing, cyanosis, edema.  Neuro: Partial numbness on left face, throughout V1/2/3 dermatomes. Strength normal and equal. Finger-to-nose test caused dizziness    Labs: No new labs    Assessment & Plan:     32 y.o. female with the following -     Problem List Items Addressed This Visit       Migraines     - Referral to neurology  - Advised cessation of activities that cause the symptoms until neuro referral         Relevant Orders    Referral to Neurology       No follow-ups on file.

## 2022-12-07 NOTE — ASSESSMENT & PLAN NOTE
- Referral to neurology  - Advised cessation of activities that cause the symptoms until neuro referral

## 2022-12-12 DIAGNOSIS — G43.919 INTRACTABLE MIGRAINE WITHOUT STATUS MIGRAINOSUS, UNSPECIFIED MIGRAINE TYPE: ICD-10-CM

## 2022-12-12 RX ORDER — AMITRIPTYLINE HYDROCHLORIDE 10 MG/1
20 TABLET, FILM COATED ORAL NIGHTLY
Qty: 60 TABLET | Refills: 0 | Status: CANCELLED | OUTPATIENT
Start: 2022-12-12

## 2022-12-12 NOTE — TELEPHONE ENCOUNTER
Received request via: Pharmacy    Was the patient seen in the last year in this department? Yes    Does the patient have an active prescription (recently filled or refills available) for medication(s) requested? No    Does the patient have [facility] Plus and need 100 day supply (blood pressure, diabetes and cholesterol meds only)? Patient does not have SCP    Pharmacy states they need these sent in again

## 2022-12-13 DIAGNOSIS — G43.919 INTRACTABLE MIGRAINE WITHOUT STATUS MIGRAINOSUS, UNSPECIFIED MIGRAINE TYPE: ICD-10-CM

## 2022-12-13 RX ORDER — NARATRIPTAN 1 MG/1
1 TABLET ORAL
Qty: 30 TABLET | Refills: 1 | Status: SHIPPED | OUTPATIENT
Start: 2022-12-13 | End: 2023-04-13

## 2022-12-13 RX ORDER — AMITRIPTYLINE HYDROCHLORIDE 10 MG/1
20 TABLET, FILM COATED ORAL NIGHTLY
Qty: 180 TABLET | Refills: 3 | Status: SHIPPED | OUTPATIENT
Start: 2022-12-13 | End: 2023-04-18

## 2023-03-29 ENCOUNTER — APPOINTMENT (OUTPATIENT)
Dept: MEDICAL GROUP | Facility: CLINIC | Age: 33
End: 2023-03-29
Payer: COMMERCIAL

## 2023-04-13 RX ORDER — RIZATRIPTAN BENZOATE 5 MG/1
5 TABLET ORAL
Qty: 30 TABLET | Refills: 0 | Status: SHIPPED | OUTPATIENT
Start: 2023-04-13 | End: 2023-04-18 | Stop reason: SDUPTHER

## 2023-04-18 ENCOUNTER — OFFICE VISIT (OUTPATIENT)
Dept: MEDICAL GROUP | Facility: CLINIC | Age: 33
End: 2023-04-18
Payer: COMMERCIAL

## 2023-04-18 VITALS — HEIGHT: 60 IN | BODY MASS INDEX: 27.48 KG/M2 | WEIGHT: 140 LBS

## 2023-04-18 DIAGNOSIS — N92.1 METRORRHAGIA: ICD-10-CM

## 2023-04-18 DIAGNOSIS — R10.13 EPIGASTRIC PAIN: ICD-10-CM

## 2023-04-18 DIAGNOSIS — G43.919 INTRACTABLE MIGRAINE WITHOUT STATUS MIGRAINOSUS, UNSPECIFIED MIGRAINE TYPE: ICD-10-CM

## 2023-04-18 PROCEDURE — 99213 OFFICE O/P EST LOW 20 MIN: CPT | Mod: GE | Performed by: STUDENT IN AN ORGANIZED HEALTH CARE EDUCATION/TRAINING PROGRAM

## 2023-04-18 RX ORDER — RIZATRIPTAN BENZOATE 5 MG/1
5 TABLET ORAL
Qty: 30 TABLET | Refills: 3 | Status: SHIPPED | OUTPATIENT
Start: 2023-04-18

## 2023-04-18 RX ORDER — AMITRIPTYLINE HYDROCHLORIDE 50 MG/1
50 TABLET, FILM COATED ORAL NIGHTLY
Qty: 90 TABLET | Refills: 3 | Status: SHIPPED | OUTPATIENT
Start: 2023-04-18

## 2023-04-18 ASSESSMENT — FIBROSIS 4 INDEX: FIB4 SCORE: 0.41

## 2023-04-18 NOTE — ASSESSMENT & PLAN NOTE
Inadequate control with current regimen of 20 mg Elavil nightly for prophylaxis.  Will increase dose to 50 mg nightly.  Maximum dose for migraine prophylaxis is 150 mg nightly.  Explained this to patient.  She is not experiencing any sedating effects with the Elavil.  Also recommended continuation of triptan as needed.  Neurology referral was made last time and patient has yet to make an appointment, encouraged her to make one even if the wait time is long.  No red flag symptoms.  Follow-up in 4 to 6 weeks.

## 2023-04-18 NOTE — ASSESSMENT & PLAN NOTE
Epigastric tenderness on exam, symptoms seem consistent with acid reflux.  Shared handout with patient regarding symptomatology and appropriate interventions.  Discussed dietary restrictions.  Will start on over-the-counter Prilosec 20 mg and Tums as needed.  Follow-up in 4 to 6 weeks.

## 2023-04-18 NOTE — PROGRESS NOTES
Subjective:     CC: Multiple concerns as below    HPI:   Adri presents today with       Problem   Epigastric Pain    Patient also experiencing epigastric pain for the past several months.  Explains that it is worse with food and occasionally experiences nausea and dark-tinged vomit.  She drinks alcohol very occasionally and smokes cigarettes very occasionally.  She does complain of stress in her life, mostly related to her 3 children.  States that her  is supportive.     Metrorrhagia    Patient complaining of abnormal uterine bleeding with sometimes as many as 2-3 periods per month.  She complains of daily spotting at times.  She is still sexually active with her .  She uses nothing for contraception because she got a bilateral tubal ligation 2 years ago after her last son.  She is adamantly opposed to any form of hormonal treatment.     Migraines    Patient reports continued migraine symptoms, unrelieved by Elavil 20 mg nightly.  She states she has been taking this since about November and initially there was some improvement, but recently she is feeling worsening of the migraines.  Rizatriptan helps for a brief while, but then her symptoms returned.  She endorses photosensitivity, phono sensitivity.  Worse with activity.  Wrong family history of migraines.           Current Outpatient Medications Ordered in Epic   Medication Sig Dispense Refill    amitriptyline (ELAVIL) 50 MG Tab Take 1 Tablet by mouth every evening. 90 Tablet 3    rizatriptan (MAXALT) 5 MG tablet Take 1 Tablet by mouth 1 time a day as needed for Migraine. Please substitute with an available or covered quantity or equivalent alternative if necessary. 30 Tablet 3     No current Epic-ordered facility-administered medications on file.       Objective:     Exam:  BP (P) 120/84 (BP Location: Right arm, Patient Position: Sitting, BP Cuff Size: Adult)   Pulse (P) 75   Temp (P) 36.3 °C (97.4 °F) (Temporal)   Ht 1.524 m (5')   Wt  63.5 kg (140 lb)   SpO2 (P) 95%   BMI 27.34 kg/m²  Body mass index is 27.34 kg/m².    General: Normal appearing. No distress.  Pulmonary: Clear to ausculation.  Normal effort. No rales, ronchi, or wheezing.  Cardiovascular: Regular rate and rhythm without murmur.   Abdomen: Soft, nondistended, epigastric tenderness. Normal bowel sounds. Liver and spleen are not palpable  Skin: Warm and dry.  No obvious lesions.  Musculoskeletal: Normal gait. No extremity cyanosis, clubbing, or edema.  Psych: Normal mood and affect. Alert and oriented x3. Judgment and insight is normal.      Assessment & Plan:     32 y.o. female with the following -     Problem List Items Addressed This Visit       Migraines     Inadequate control with current regimen of 20 mg Elavil nightly for prophylaxis.  Will increase dose to 50 mg nightly.  Maximum dose for migraine prophylaxis is 150 mg nightly.  Explained this to patient.  She is not experiencing any sedating effects with the Elavil.  Also recommended continuation of triptan as needed.  Neurology referral was made last time and patient has yet to make an appointment, encouraged her to make one even if the wait time is long.  No red flag symptoms.  Follow-up in 4 to 6 weeks.         Relevant Medications    amitriptyline (ELAVIL) 50 MG Tab    rizatriptan (MAXALT) 5 MG tablet    Epigastric pain     Epigastric tenderness on exam, symptoms seem consistent with acid reflux.  Shared handout with patient regarding symptomatology and appropriate interventions.  Discussed dietary restrictions.  Will start on over-the-counter Prilosec 20 mg and Tums as needed.  Follow-up in 4 to 6 weeks.         Metrorrhagia     Patient with abnormal uterine bleeding ever since the bilateral tubal ligation.  I wouldn't think the sterilization should impact the menstrual cycle. Differentials include hormone imbalance, endometrial hyperplasia, coagulopathy, structural concern.  Patient unfortunately is unwilling to  trial any hormonal treatment, including Mirena IUD. Patient states she had a normal Pap Smear at [facility] one year ago, which is reassuring. Will also complete coagulopathy labs. Depending on results, will get TVUS vs. Referral to Gynecology.          Relevant Orders    [facility] PROFILE    Prothrombin Time    APTT         No follow-ups on file.    Ana Luisa Gaston MD   PGY-3

## 2023-04-18 NOTE — ASSESSMENT & PLAN NOTE
Patient with abnormal uterine bleeding ever since the bilateral tubal ligation.  I wouldn't think the sterilization should impact the menstrual cycle. Differentials include hormone imbalance, endometrial hyperplasia, coagulopathy, structural concern.  Patient unfortunately is unwilling to trial any hormonal treatment, including Mirena IUD. Patient states she had a normal Pap Smear at [facility] one year ago, which is reassuring. Will also complete coagulopathy labs. Depending on results, will get TVUS vs. Referral to Gynecology.

## 2023-08-07 ENCOUNTER — APPOINTMENT (OUTPATIENT)
Dept: RADIOLOGY | Facility: MEDICAL CENTER | Age: 33
End: 2023-08-07
Attending: EMERGENCY MEDICINE
Payer: COMMERCIAL

## 2023-08-07 ENCOUNTER — HOSPITAL ENCOUNTER (EMERGENCY)
Facility: MEDICAL CENTER | Age: 33
End: 2023-08-07
Attending: EMERGENCY MEDICINE
Payer: COMMERCIAL

## 2023-08-07 VITALS
WEIGHT: 135 LBS | HEART RATE: 99 BPM | HEIGHT: 61 IN | OXYGEN SATURATION: 92 % | BODY MASS INDEX: 25.49 KG/M2 | RESPIRATION RATE: 18 BRPM | TEMPERATURE: 98.2 F | DIASTOLIC BLOOD PRESSURE: 58 MMHG | SYSTOLIC BLOOD PRESSURE: 117 MMHG

## 2023-08-07 DIAGNOSIS — F43.0 STRESS REACTION: ICD-10-CM

## 2023-08-07 DIAGNOSIS — G89.29 CHRONIC NONINTRACTABLE HEADACHE, UNSPECIFIED HEADACHE TYPE: ICD-10-CM

## 2023-08-07 DIAGNOSIS — R51.9 CHRONIC NONINTRACTABLE HEADACHE, UNSPECIFIED HEADACHE TYPE: ICD-10-CM

## 2023-08-07 DIAGNOSIS — R07.9 LEFT-SIDED CHEST PAIN: ICD-10-CM

## 2023-08-07 LAB
ALBUMIN SERPL BCP-MCNC: 4.6 G/DL (ref 3.2–4.9)
ALBUMIN/GLOB SERPL: 1.4 G/DL
ALP SERPL-CCNC: 100 U/L (ref 30–99)
ALT SERPL-CCNC: 11 U/L (ref 2–50)
ANION GAP SERPL CALC-SCNC: 18 MMOL/L (ref 7–16)
AST SERPL-CCNC: 13 U/L (ref 12–45)
BASOPHILS # BLD AUTO: 0.6 % (ref 0–1.8)
BASOPHILS # BLD: 0.06 K/UL (ref 0–0.12)
BILIRUB SERPL-MCNC: 0.2 MG/DL (ref 0.1–1.5)
BUN SERPL-MCNC: 7 MG/DL (ref 8–22)
CALCIUM ALBUM COR SERPL-MCNC: 8.5 MG/DL (ref 8.5–10.5)
CALCIUM SERPL-MCNC: 9 MG/DL (ref 8.5–10.5)
CHLORIDE SERPL-SCNC: 108 MMOL/L (ref 96–112)
CO2 SERPL-SCNC: 17 MMOL/L (ref 20–33)
CREAT SERPL-MCNC: 0.43 MG/DL (ref 0.5–1.4)
D DIMER PPP IA.FEU-MCNC: 0.27 UG/ML (FEU) (ref 0–0.5)
EKG IMPRESSION: NORMAL
EOSINOPHIL # BLD AUTO: 0.1 K/UL (ref 0–0.51)
EOSINOPHIL NFR BLD: 1 % (ref 0–6.9)
ERYTHROCYTE [DISTWIDTH] IN BLOOD BY AUTOMATED COUNT: 41.1 FL (ref 35.9–50)
ETHANOL BLD-MCNC: 131.4 MG/DL
GFR SERPLBLD CREATININE-BSD FMLA CKD-EPI: 132 ML/MIN/1.73 M 2
GLOBULIN SER CALC-MCNC: 3.3 G/DL (ref 1.9–3.5)
GLUCOSE SERPL-MCNC: 104 MG/DL (ref 65–99)
HCG SERPL QL: NEGATIVE
HCT VFR BLD AUTO: 36.4 % (ref 37–47)
HGB BLD-MCNC: 11.6 G/DL (ref 12–16)
IMM GRANULOCYTES # BLD AUTO: 0.05 K/UL (ref 0–0.11)
IMM GRANULOCYTES NFR BLD AUTO: 0.5 % (ref 0–0.9)
LIPASE SERPL-CCNC: 23 U/L (ref 11–82)
LYMPHOCYTES # BLD AUTO: 3.19 K/UL (ref 1–4.8)
LYMPHOCYTES NFR BLD: 32.4 % (ref 22–41)
MCH RBC QN AUTO: 26.7 PG (ref 27–33)
MCHC RBC AUTO-ENTMCNC: 31.9 G/DL (ref 32.2–35.5)
MCV RBC AUTO: 83.9 FL (ref 81.4–97.8)
MONOCYTES # BLD AUTO: 0.53 K/UL (ref 0–0.85)
MONOCYTES NFR BLD AUTO: 5.4 % (ref 0–13.4)
NEUTROPHILS # BLD AUTO: 5.92 K/UL (ref 1.82–7.42)
NEUTROPHILS NFR BLD: 60.1 % (ref 44–72)
NRBC # BLD AUTO: 0 K/UL
NRBC BLD-RTO: 0 /100 WBC (ref 0–0.2)
PLATELET # BLD AUTO: 309 K/UL (ref 164–446)
PMV BLD AUTO: 9.9 FL (ref 9–12.9)
POTASSIUM SERPL-SCNC: 3.6 MMOL/L (ref 3.6–5.5)
PROT SERPL-MCNC: 7.9 G/DL (ref 6–8.2)
RBC # BLD AUTO: 4.34 M/UL (ref 4.2–5.4)
SODIUM SERPL-SCNC: 143 MMOL/L (ref 135–145)
TROPONIN T SERPL-MCNC: 6 NG/L (ref 6–19)
WBC # BLD AUTO: 9.9 K/UL (ref 4.8–10.8)

## 2023-08-07 PROCEDURE — 36415 COLL VENOUS BLD VENIPUNCTURE: CPT

## 2023-08-07 PROCEDURE — 80053 COMPREHEN METABOLIC PANEL: CPT

## 2023-08-07 PROCEDURE — 84484 ASSAY OF TROPONIN QUANT: CPT

## 2023-08-07 PROCEDURE — 71045 X-RAY EXAM CHEST 1 VIEW: CPT

## 2023-08-07 PROCEDURE — 82077 ASSAY SPEC XCP UR&BREATH IA: CPT

## 2023-08-07 PROCEDURE — 85379 FIBRIN DEGRADATION QUANT: CPT

## 2023-08-07 PROCEDURE — 93005 ELECTROCARDIOGRAM TRACING: CPT | Performed by: EMERGENCY MEDICINE

## 2023-08-07 PROCEDURE — 84703 CHORIONIC GONADOTROPIN ASSAY: CPT

## 2023-08-07 PROCEDURE — 93005 ELECTROCARDIOGRAM TRACING: CPT

## 2023-08-07 PROCEDURE — 99285 EMERGENCY DEPT VISIT HI MDM: CPT

## 2023-08-07 PROCEDURE — 94760 N-INVAS EAR/PLS OXIMETRY 1: CPT

## 2023-08-07 PROCEDURE — 85025 COMPLETE CBC W/AUTO DIFF WBC: CPT

## 2023-08-07 PROCEDURE — 83690 ASSAY OF LIPASE: CPT

## 2023-08-07 ASSESSMENT — FIBROSIS 4 INDEX: FIB4 SCORE: 0.41

## 2023-08-07 ASSESSMENT — PAIN DESCRIPTION - PAIN TYPE: TYPE: ACUTE PAIN

## 2023-08-07 NOTE — ED TRIAGE NOTES
Alejandra Reyes Villegas  32 y.o. female  Chief Complaint   Patient presents with    Headache    Chest Pain     Pt on wheelchair to triage with  for above complaint.  reports patient is taking migraine medication and was instructed to avoid ETOH.  came home and found out she drank ETOH then patient started to have headache and chest pain after verbal argument.     Pt is GCS 15, speaking in full sentences, follows commands and responds appropriately to questions. Resp are even and unlabored.      Pt placed in ED lobby. Pt educated on triage process. Pt encouraged to alert staff for any changes.       Vitals:    08/07/23 0230   BP: 119/76   Pulse: (!) 143   Resp: (!) 26   Temp: 36.4 °C (97.6 °F)   SpO2: 97%

## 2023-08-07 NOTE — ED NOTES
Pt ambulated to blue 20 from lobby with steady gait.  On monitor, call light in reach. Chart up for ERP.

## 2023-08-07 NOTE — ED PROVIDER NOTES
"                                                        ED Provider Note    CHIEF COMPLAINT  Chief Complaint   Patient presents with    Headache    Chest Pain        HPI    Primary care provider: Danis Ernandez M.D.   History obtained from: Patient and   History limited by: None     Alejandra Reyes Villegas is a 32 y.o. female who presents to the ED with  for left-sided chest pain.   reports that patient has had chronic migraine headache and was started on amitriptyline and a \"red pill\" and was told not to drink alcohol with her medicines.  He also reports that patient was scheduled to have MRI of her brain due to her chronic migraine headache but did not make her appointment because she was afraid to be in the tube.  He also reports that patient has had recurrent chest pain but missed her cardiology appointment.   reports that he came home from work at 1:00 this morning and patient apparently had gone out with friends and drank alcohol and they got into an argument and patient \"was going nuts and having issues with breathing\" and so he brought patient into the ED.  Patient is reporting left upper chest pain and difficulty with breathing that started with the argument tonight.  She is requesting to leave the ED.  Patient states that she just wants to have her pills and does not want to stay.  She denies fever.  She has had nausea but no vomiting.  She denies diarrhea/dysuria.  She denies possibility of pregnancy and reports history of tubal ligation.   reports that patient would vary between saying \"she is fine and then she is not.\"    REVIEW OF SYSTEMS  Please see HPI for pertinent positives/negatives.  All other systems reviewed and are negative.     PAST MEDICAL HISTORY  Past Medical History:   Diagnosis Date    Psychiatric problem     depression        SURGICAL HISTORY  Past Surgical History:   Procedure Laterality Date    REPEAT C SECTOIN WITH SALPINGECTOMY Bilateral " "3/30/2021    Procedure:  SECTION, REPEAT, WITH SALPINGECTOMY;  Surgeon: Michelle Kelly M.D.;  Location: SURGERY LABOR AND DELIVERY;  Service: Labor and Delivery    REPEAT C SECTION  2018    Procedure: REPEAT C SECTION;  Surgeon: Jenna Toscano M.D.;  Location: LABOR AND DELIVERY;  Service: Labor and Delivery    GYN SURGERY      C/S        SOCIAL HISTORY  Social History     Tobacco Use    Smoking status: Never    Smokeless tobacco: Never   Vaping Use    Vaping Use: Never used   Substance and Sexual Activity    Alcohol use: Not Currently    Drug use: No    Sexual activity: Not on file        FAMILY HISTORY  No family history on file.     CURRENT MEDICATIONS  Home Medications       Reviewed by Dk Davis R.N. (Registered Nurse) on 23 at 0242  Med List Status: Partial     Medication Last Dose Status   amitriptyline (ELAVIL) 50 MG Tab  Active   rizatriptan (MAXALT) 5 MG tablet  Active                     ALLERGIES  No Known Allergies     PHYSICAL EXAM  VITAL SIGNS: /58   Pulse 99   Temp 36.8 °C (98.2 °F) (Temporal)   Resp 18   Ht 1.549 m (5' 1\")   Wt 61.2 kg (135 lb)   LMP 2023   SpO2 92%   BMI 25.51 kg/m²  @ERIKA[[id number]::@     Pulse ox interpretation: 97% I interpret this pulse ox as normal     Cardiac monitor interpretation: Sinus tachycardia with heart rate in the 120s as interpreted by me.  The patient presented with chest pain and cardiac monitor was ordered to monitor for dysrhythmia.    Constitutional: Well developed, well nourished, alert, nontoxic appearance    HENT: No external signs of trauma, normocephalic  Eyes: PERRL, conjunctiva without erythema, no discharge, no icterus    Neck: Soft and supple, trachea midline, no stridor, no tenderness, no LAD, good ROM    Cardiovascular: Regular and tachycardic, no murmurs/rubs/gallops, strong distal pulses and good perfusion    Thorax & Lungs: No respiratory distress, CTAB    Abdomen: Soft, nontender, " nondistended, no guarding, no rebound, normal BS    Back: No CVAT     Extremities: No cyanosis, no edema, no gross deformity, good ROM, intact distal pulses with brisk cap refill    Skin: Warm, dry, no pallor/cyanosis, no rash noted      Neuro: A/O times 3, no focal deficits noted    Psychiatric: Slightly anxious      DIAGNOSTIC STUDIES / PROCEDURES    EKG  12 Lead EKG obtained at 0240 and interpreted by me:   Rate: 119   Rhythm: Sinus tachycardia  Ectopy: None  Intervals: Normal   Axis: Normal   QRS: Normal   ST segments: Minimal ST depression inferior and lateral leads  T Waves: Normal    Clinical Impression: Sinus tachycardia with nonspecific ST changes  Compared to November 18, 2020 without significant change except for the tachycardia      LABS  All labs reviewed by me.     Results for orders placed or performed during the hospital encounter of 08/07/23   CBC WITH DIFFERENTIAL   Result Value Ref Range    WBC 9.9 4.8 - 10.8 K/uL    RBC 4.34 4.20 - 5.40 M/uL    Hemoglobin 11.6 (L) 12.0 - 16.0 g/dL    Hematocrit 36.4 (L) 37.0 - 47.0 %    MCV 83.9 81.4 - 97.8 fL    MCH 26.7 (L) 27.0 - 33.0 pg    MCHC 31.9 (L) 32.2 - 35.5 g/dL    RDW 41.1 35.9 - 50.0 fL    Platelet Count 309 164 - 446 K/uL    MPV 9.9 9.0 - 12.9 fL    Neutrophils-Polys 60.10 44.00 - 72.00 %    Lymphocytes 32.40 22.00 - 41.00 %    Monocytes 5.40 0.00 - 13.40 %    Eosinophils 1.00 0.00 - 6.90 %    Basophils 0.60 0.00 - 1.80 %    Immature Granulocytes 0.50 0.00 - 0.90 %    Nucleated RBC 0.00 0.00 - 0.20 /100 WBC    Neutrophils (Absolute) 5.92 1.82 - 7.42 K/uL    Lymphs (Absolute) 3.19 1.00 - 4.80 K/uL    Monos (Absolute) 0.53 0.00 - 0.85 K/uL    Eos (Absolute) 0.10 0.00 - 0.51 K/uL    Baso (Absolute) 0.06 0.00 - 0.12 K/uL    Immature Granulocytes (abs) 0.05 0.00 - 0.11 K/uL    NRBC (Absolute) 0.00 K/uL   COMP METABOLIC PANEL   Result Value Ref Range    Sodium 143 135 - 145 mmol/L    Potassium 3.6 3.6 - 5.5 mmol/L    Chloride 108 96 - 112 mmol/L    Co2  17 (L) 20 - 33 mmol/L    Anion Gap 18.0 (H) 7.0 - 16.0    Glucose 104 (H) 65 - 99 mg/dL    Bun 7 (L) 8 - 22 mg/dL    Creatinine 0.43 (L) 0.50 - 1.40 mg/dL    Calcium 9.0 8.5 - 10.5 mg/dL    Correct Calcium 8.5 8.5 - 10.5 mg/dL    AST(SGOT) 13 12 - 45 U/L    ALT(SGPT) 11 2 - 50 U/L    Alkaline Phosphatase 100 (H) 30 - 99 U/L    Total Bilirubin 0.2 0.1 - 1.5 mg/dL    Albumin 4.6 3.2 - 4.9 g/dL    Total Protein 7.9 6.0 - 8.2 g/dL    Globulin 3.3 1.9 - 3.5 g/dL    A-G Ratio 1.4 g/dL   LIPASE   Result Value Ref Range    Lipase 23 11 - 82 U/L   TROPONIN   Result Value Ref Range    Troponin T 6 6 - 19 ng/L   D-DIMER   Result Value Ref Range    D-Dimer 0.27 0.00 - 0.50 ug/mL (FEU)   BETA-HCG QUALITATIVE SERUM   Result Value Ref Range    Beta-Hcg Qualitative Serum Negative Negative   DIAGNOSTIC ALCOHOL   Result Value Ref Range    Diagnostic Alcohol 131.4 (H) <10.1 mg/dL   ESTIMATED GFR   Result Value Ref Range    GFR (CKD-EPI) 132 >60 mL/min/1.73 m 2   EKG (NOW)   Result Value Ref Range    Report       [facility] Emergency Dept.    Test Date:  2023  Pt Name:    ALEJANDRA REYES VILLEGAS     Department: ER  MRN:        [id number]                      Room:  Gender:     Female                       Technician: [id number]  :        1990                   Requested By:ER TRIAGE PROTOCOL  Order #:    [id number]                    Reading MD:    Measurements  Intervals                                Axis  Rate:       119                          P:          76  CO:         155                          QRS:        81  QRSD:       93                           T:          2  QT:         323  QTc:        455    Interpretive Statements  Sinus tachycardia  RSR' in V1 or V2, right VCD or RVH  Compared to ECG 2020 13:41:42  Right ventricular hypertrophy now present  RSR' in V1 or V2 now present  Sinus rhythm no longer present          RADIOLOGY  I have independently interpreted the diagnostic imaging  associated with this visit and am waiting the final reading from the radiologist.   My preliminary interpretation is as follows: No acute findings.    DX-CHEST-PORTABLE (1 VIEW)   Final Result      No acute process.             COURSE & MEDICAL DECISION MAKING  Nursing notes, VS, PMSFHx reviewed in chart.     Review of past medical records shows the patient was last seen in the office on April 18, 2023 regarding intractable migraine, epigastric abdominal pain and metrorrhagia.  Last cardiac echo on February 1, 2021 had the following findings:    Normal left ventricular systolic function. Left ventricular ejection   fraction is visually estimated to be 65%.   Normal right ventricular size and function.  No significant valvular abnormalities.  Compared to the images of the prior study done 12/03/2018, no   significant change.      CTPA on December 2, 2018 had the following findings:    No central or large segmental pulmonary embolus is identified.     No acute cardiopulmonary process is seen.     Free intraperitoneal air is likely related to recent surgery.      Differential diagnoses considered include but are not limited to: AMI, dissection, PE, pneumothorax, CHF/pulm edema, pericardial effusion/tamponade, myocarditis, pericarditis, esophageal spasm, GERD, gastritis, PUD, hiatal hernia, pancreatitis, anxiety, stress reaction      ED Observation Status? Yes; I am placing the patient in to an observation status due to a diagnostic uncertainty as well as therapeutic intensity. Patient placed in observation status at 3:44 AM, 8/7/2023.     Observation plan is as follows: We will obtain EKG, imaging and laboratory studies and monitor patient in the ED.    Upon Reevaluation, the patient's condition has: Improved; and will be discharged.    Patient discharged from ED Observation status at 0506 on August 7, 2023.      INITIAL ASSESSMENT AND PLAN  Care Narrative: This is a 32-year-old female patient with history of  depression who presents with  to the ED for chest pain that began while having argument tonight.  Will obtain EKG, imaging and laboratory studies and closely monitor patient in the ED.      Discussion of management with other QHP or appropriate source(s): None    Escalation of care considered, and ultimately not performed: acute inpatient care management, however at this time, the patient is most appropriate for outpatient management.     Decision tools and prescription drugs considered including, but not limited to: Pain Medications   .        Pt risk-stratified as low risk for MACE in the next 6 weeks by HEART Score (0-3): 2    HISTORY  Highly suspicious +2  Moderately suspicious +1  Slightly suspicious 0    EKG  Significant ST depression +2  Nonspecific repolarization disturbance +1  Normal 0    AGE  ? 65 +2  45-65 +1  < 45 0    RISK FACTORS  Hypercholesterolemia, HTN, DM, Cigarette smoking, positive family history, obesity  ? 3 risk factors or history of atherosclerotic disease +2  1-2 risk factors +1  No risk factors known 0    TROPONIN  ? 3× normal limit +2  1-3× normal limit +1  ? normal limit 0      History and physical exam as above.  EKG shows sinus tachycardia with nonspecific ST changes while troponin without elevation.  This is unlikely to be ACS.  Unlikely to be PE given negative D-dimer.  Low clinical suspicion for other emergent pathology such as dissection, tamponade, myocarditis, Takotsubo's.  Presenting tachycardia and tachypnea improved and normalized during her ED stay without intervention.  Laboratory testing shows mild anemia which she has had on previous test results.  Patient with mild anion gap acidosis which may be due to her alcohol use tonight.  Chest x-ray without evidence for acute abnormality.  She was monitored in the ED and remained clinically stable.  She does not have focal neurological findings or concerning features to suggest need for emergent brain imaging or lumbar  puncture.  I discussed the findings with patient and .  Patient is noted to be in no acute distress and nontoxic in appearance.  No convincing evidence at this time for emergent pathology or indications for admission.  They were advised on outpatient follow-up regarding her chronic headache and recurrent chest pain.  Return to ED precautions given.  Patient and  verbalized understanding and agreed with plan of care with no further questions or concerns.      The patient is referred to a primary physician for blood pressure management, diabetic screening, and for all other preventative health concerns.       FINAL IMPRESSION  1. Left-sided chest pain Acute   2. Chronic nonintractable headache, unspecified headache type Acute   3. Stress reaction Acute          DISPOSITION  Patient will be discharged home in stable condition.       FOLLOW UP  Danis Ernandez M.D.  [address] [facility] [address] [phone]    Call today      [facility], Emergency Dept  [address] [facility] [id number] [phone]    If symptoms worsen         OUTPATIENT MEDICATIONS  Discharge Medication List as of 8/7/2023  5:19 AM             Electronically signed by: Ector Owens D.O., 8/7/2023 3:43 AM      Portions of this record were made with voice recognition software.  Despite my review, errors may remain.  Please interpret this chart in the appropriate context.

## 2023-08-07 NOTE — ED NOTES
Pt discharged. GCS 15. IV discontinued and gauze placed, pt in possession of belongings. Pt provided discharge education and information pertaining to medications and follow up appointments. Pt received copy of discharge instructions and verbalized understanding.     Vitals:    08/07/23 0501   BP: 117/58   Pulse: 99   Resp: 18   Temp: 36.8 °C (98.2 °F)   SpO2: 92%

## 2023-11-30 NOTE — DISCHARGE PLANNING
Birth certificate has been completed.  
Discharge Planning Assessment Post Partum     Reason for Referral:  Consult-MOB scored a 14 on the EPDS screen  Address: [address] [place] [address]  Phone: [phone]  Type of Living Situation: living with FOB and children  Mom Diagnosis: Pregnancy,   Baby Diagnosis: [place]-39.4 weeks  Primary Language: English speaking-the FOB speaks English and interpreted     Name of Baby: Waylon   Father of the Baby: Waylon Le  Involved in baby’s care? Yes  Contact Information: [phone]     Prenatal Care: Yes  PCP for new baby: [facility]     Support System: FOB  Coping/Bonding between mother & baby: Yes  Source of Feeding: breast feeding  Supplies for Infant: prepared for infant; denies any needs     Mom's Insurance: [employer]  Baby Covered on Insurance:Yes  Mother Employed/School: Not currently  Other children in the home/names & ages: two other children     Financial Hardship/Income: No-FOB is employed with [employer]  Mom's Mental status: alert and oriented  Services used prior to admit: none     CPS History: No  Psychiatric History: No-discussed post partum depression with parents and provided a list of counseling resources specializing in maternal mental health  Domestic Violence History: No  Drug/ETOH History: No     Resources Provided: post partum support and counseling, children and family resource list, and a list of [facility] clinics were provided to parents  Referrals Made: diaper bank referral provided      Clearance for Discharge: Infant is cleared to discharge home with parents                    
yes

## 2024-03-10 ENCOUNTER — APPOINTMENT (OUTPATIENT)
Dept: URGENT CARE | Facility: CLINIC | Age: 34
End: 2024-03-10

## 2024-03-10 ENCOUNTER — OFFICE VISIT (OUTPATIENT)
Dept: URGENT CARE | Facility: CLINIC | Age: 34
End: 2024-03-10
Payer: COMMERCIAL

## 2024-03-10 VITALS
DIASTOLIC BLOOD PRESSURE: 72 MMHG | HEIGHT: 61 IN | HEART RATE: 87 BPM | BODY MASS INDEX: 26.64 KG/M2 | WEIGHT: 141.09 LBS | SYSTOLIC BLOOD PRESSURE: 110 MMHG | TEMPERATURE: 97.3 F | OXYGEN SATURATION: 98 % | RESPIRATION RATE: 16 BRPM

## 2024-03-10 DIAGNOSIS — Z86.69 HX OF MIGRAINE HEADACHES: ICD-10-CM

## 2024-03-10 DIAGNOSIS — G43.119 INTRACTABLE MIGRAINE WITH AURA WITHOUT STATUS MIGRAINOSUS: Primary | ICD-10-CM

## 2024-03-10 DIAGNOSIS — R11.2 NAUSEA AND VOMITING, UNSPECIFIED VOMITING TYPE: ICD-10-CM

## 2024-03-10 PROCEDURE — 3074F SYST BP LT 130 MM HG: CPT | Performed by: PHYSICIAN ASSISTANT

## 2024-03-10 PROCEDURE — 3078F DIAST BP <80 MM HG: CPT | Performed by: PHYSICIAN ASSISTANT

## 2024-03-10 PROCEDURE — 99214 OFFICE O/P EST MOD 30 MIN: CPT | Performed by: PHYSICIAN ASSISTANT

## 2024-03-10 RX ORDER — ONDANSETRON 4 MG/1
4 TABLET, ORALLY DISINTEGRATING ORAL ONCE
Status: COMPLETED | OUTPATIENT
Start: 2024-03-10 | End: 2024-03-10

## 2024-03-10 RX ORDER — AMITRIPTYLINE HYDROCHLORIDE 50 MG/1
50 TABLET, FILM COATED ORAL NIGHTLY
Qty: 30 TABLET | Refills: 0 | Status: SHIPPED | OUTPATIENT
Start: 2024-03-10

## 2024-03-10 RX ORDER — KETOROLAC TROMETHAMINE 30 MG/ML
15 INJECTION, SOLUTION INTRAMUSCULAR; INTRAVENOUS ONCE
Status: COMPLETED | OUTPATIENT
Start: 2024-03-10 | End: 2024-03-10

## 2024-03-10 RX ORDER — ONDANSETRON 4 MG/1
4 TABLET, ORALLY DISINTEGRATING ORAL EVERY 6 HOURS PRN
Qty: 10 TABLET | Refills: 0 | Status: SHIPPED | OUTPATIENT
Start: 2024-03-10

## 2024-03-10 RX ADMIN — ONDANSETRON 4 MG: 4 TABLET, ORALLY DISINTEGRATING ORAL at 12:36

## 2024-03-10 RX ADMIN — KETOROLAC TROMETHAMINE 15 MG: 30 INJECTION, SOLUTION INTRAMUSCULAR; INTRAVENOUS at 15:33

## 2024-03-10 ASSESSMENT — FIBROSIS 4 INDEX: FIB4 SCORE: 0.41

## 2024-03-16 ASSESSMENT — ENCOUNTER SYMPTOMS
FOCAL WEAKNESS: 0
CHILLS: 0
SENSORY CHANGE: 0
SEIZURES: 0
TREMORS: 0
SPEECH CHANGE: 0
ABDOMINAL PAIN: 0
HEADACHES: 1
LOSS OF CONSCIOUSNESS: 0
TINGLING: 0
DIZZINESS: 0
VOMITING: 1
FEVER: 0
NAUSEA: 1
WEAKNESS: 0

## 2024-03-16 NOTE — PROGRESS NOTES
Subjective     Alejandra Reyes Villegas is a 33 y.o. female who presents with Migraine (Chronic migraines. Pt reports that spouse disposed medication on accident. Migraine has returned and worsen over the last few days. N/v due to migraines )    PMH:  has a past medical history of Psychiatric problem.  MEDS:   Current Outpatient Medications:     amitriptyline (ELAVIL) 50 MG Tab, Take 1 Tablet by mouth every evening., Disp: 30 Tablet, Rfl: 0    ondansetron (ZOFRAN ODT) 4 MG TABLET DISPERSIBLE, Take 1 Tablet by mouth every 6 hours as needed for Nausea/Vomiting., Disp: 10 Tablet, Rfl: 0    amitriptyline (ELAVIL) 50 MG Tab, Take 1 Tablet by mouth every evening., Disp: 90 Tablet, Rfl: 3    rizatriptan (MAXALT) 5 MG tablet, Take 1 Tablet by mouth 1 time a day as needed for Migraine. Please substitute with an available or covered quantity or equivalent alternative if necessary., Disp: 30 Tablet, Rfl: 3  ALLERGIES: No Known Allergies  SURGHX:   Past Surgical History:   Procedure Laterality Date    REPEAT C SECTOIN WITH SALPINGECTOMY Bilateral 3/30/2021    Procedure:  SECTION, REPEAT, WITH SALPINGECTOMY;  Surgeon: Michelle Kelly M.D.;  Location: SURGERY LABOR AND DELIVERY;  Service: Labor and Delivery    REPEAT C SECTION  2018    Procedure: REPEAT C SECTION;  Surgeon: Jenna Toscano M.D.;  Location: LABOR AND DELIVERY;  Service: Labor and Delivery    GYN SURGERY      C/S     SOCHX:  reports that she has never smoked. She has never used smokeless tobacco. She reports that she does not currently use alcohol. She reports that she does not use drugs.  FH: Reviewed with patient, not pertinent to this visit.           Patient presents with:  Migraine: PT has history of chronic migraines. Pt reports that her spouse accidentally disposed migraine (amitriptyline 50 mg QHS) medication on accident. PT has not taken this medication for one week.  Migraine has returned and worsen over the last few days.  "N/v due to migraines.  PT was hoping to get new Rx until she can be seen next week by PCP.  No other complaint.  Patient states this is typical migraine for her, though nausea vomiting and photosensitivity is worse because she has not had her medication for a week.  Patient has had a Toradol injection in the past for headaches when they have gotten severe.  Patient denies chest pain, shortness of breath, vision changes or any other complaints.            Review of Systems   Constitutional:  Negative for chills and fever.   Gastrointestinal:  Positive for nausea and vomiting. Negative for abdominal pain.   Neurological:  Positive for headaches. Negative for dizziness, tingling, tremors, sensory change, speech change, focal weakness, seizures, loss of consciousness and weakness.   All other systems reviewed and are negative.             Objective     /72   Pulse 87   Temp 36.3 °C (97.3 °F) (Temporal)   Resp 16   Ht 1.549 m (5' 1\")   Wt 64 kg (141 lb 1.5 oz)   SpO2 98%   BMI 26.66 kg/m²      Physical Exam  Vitals and nursing note reviewed.   Constitutional:       General: She is awake. She is not in acute distress.     Appearance: Normal appearance. She is well-developed. She is not ill-appearing or toxic-appearing.   HENT:      Head: Normocephalic and atraumatic.      Right Ear: Tympanic membrane normal.      Left Ear: Tympanic membrane normal.      Nose: Nose normal.      Mouth/Throat:      Lips: Pink.      Mouth: Mucous membranes are moist.      Pharynx: Oropharynx is clear. Uvula midline.   Eyes:      Extraocular Movements: Extraocular movements intact.      Conjunctiva/sclera: Conjunctivae normal.      Pupils: Pupils are equal, round, and reactive to light.      Comments: Photophobia   Cardiovascular:      Rate and Rhythm: Normal rate and regular rhythm.      Pulses: Normal pulses.      Heart sounds: Normal heart sounds.   Pulmonary:      Effort: Pulmonary effort is normal.      Breath sounds: Normal " breath sounds.   Abdominal:      General: Bowel sounds are normal.      Palpations: Abdomen is soft.   Musculoskeletal:         General: Normal range of motion.      Cervical back: Normal range of motion and neck supple.   Skin:     General: Skin is warm and dry.      Capillary Refill: Capillary refill takes less than 2 seconds.   Neurological:      General: No focal deficit present.      Mental Status: She is alert and oriented to person, place, and time.      Cranial Nerves: No cranial nerve deficit.      Sensory: No sensory deficit.      Motor: Motor function is intact. No weakness.      Coordination: Coordination is intact. Coordination normal.      Gait: Gait normal.      Deep Tendon Reflexes: Reflexes normal.   Psychiatric:         Mood and Affect: Mood normal.                             Assessment & Plan        1. Intractable migraine with aura without status migrainosus     - amitriptyline (ELAVIL) 50 MG Tab; Take 1 Tablet by mouth every evening.  Dispense: 30 Tablet; Refill: 0  - ondansetron (Zofran ODT) dispertab 4 mg  - ondansetron (ZOFRAN ODT) 4 MG TABLET DISPERSIBLE; Take 1 Tablet by mouth every 6 hours as needed for Nausea/Vomiting.  Dispense: 10 Tablet; Refill: 0  - ketorolac (Toradol) injection 15 mg    2. Nausea and vomiting, unspecified vomiting type    - amitriptyline (ELAVIL) 50 MG Tab; Take 1 Tablet by mouth every evening.  Dispense: 30 Tablet; Refill: 0  - ondansetron (Zofran ODT) dispertab 4 mg  - ondansetron (ZOFRAN ODT) 4 MG TABLET DISPERSIBLE; Take 1 Tablet by mouth every 6 hours as needed for Nausea/Vomiting.  Dispense: 10 Tablet; Refill: 0  - ketorolac (Toradol) injection 15 mg            Patient with well-documented history of chronic migraines presents to clinic today with complaint of nausea vomiting photophobia secondary to migraine headache.  Patient has not had her migraine medication for approximately 1 week due to her  accidentally discarding it while cleaning.  Patient  states this headache is typical migraine for her, but has no medication to take.    Patient given Toradol 15 mg IM in clinic as well as 4 mg Zofran ODT.    I have sent a prescription for amitriptyline 50 mg nightly x 30 tablets for patient to use as a bridge until she can be seen by her primary care provider or neurology.    Differential diagnosis, supportive care, and indications for immediate follow-up discussed with patient.  Instructed to return to clinic or nearest emergency department for any change in condition, further concerns, or worsening of symptoms.    I personally reviewed prior external notes and test results pertinent to today's visit.  I have independently reviewed and interpreted all diagnostics ordered during this urgent care visit.    PT should follow up with PCP in 1-2 days for re-evaluation if symptoms have not improved.      Discussed red flags and reasons to return to UC or ED.      Pt and/or family verbalized understanding of diagnosis and follow up instructions and was offered informational handout on diagnosis.  PT discharged.     Please note that this dictation was created using voice recognition software. I have made every reasonable attempt to correct obvious errors, but I expect that there may be errors of grammar and possibly content that I did not discover before finalizing the note.

## 2024-04-03 ENCOUNTER — OFFICE VISIT (OUTPATIENT)
Dept: MEDICAL GROUP | Facility: CLINIC | Age: 34
End: 2024-04-03
Payer: COMMERCIAL

## 2024-04-03 VITALS
RESPIRATION RATE: 20 BRPM | OXYGEN SATURATION: 94 % | BODY MASS INDEX: 28.66 KG/M2 | HEART RATE: 100 BPM | DIASTOLIC BLOOD PRESSURE: 76 MMHG | SYSTOLIC BLOOD PRESSURE: 111 MMHG | HEIGHT: 60 IN | TEMPERATURE: 97.1 F | WEIGHT: 146 LBS

## 2024-04-03 DIAGNOSIS — Z86.2 HISTORY OF ANEMIA: ICD-10-CM

## 2024-04-03 DIAGNOSIS — M54.2 NECK DISCOMFORT: ICD-10-CM

## 2024-04-03 DIAGNOSIS — G43.919 INTRACTABLE MIGRAINE WITHOUT STATUS MIGRAINOSUS, UNSPECIFIED MIGRAINE TYPE: ICD-10-CM

## 2024-04-03 DIAGNOSIS — N92.1 METRORRHAGIA: ICD-10-CM

## 2024-04-03 PROCEDURE — 99214 OFFICE O/P EST MOD 30 MIN: CPT | Mod: GC

## 2024-04-03 RX ORDER — AMITRIPTYLINE HYDROCHLORIDE 50 MG/1
50 TABLET, FILM COATED ORAL NIGHTLY
Qty: 90 TABLET | Refills: 3 | Status: SHIPPED | OUTPATIENT
Start: 2024-04-03

## 2024-04-03 RX ORDER — PROPRANOLOL HYDROCHLORIDE 40 MG/1
40 TABLET ORAL 2 TIMES DAILY
Qty: 60 TABLET | Refills: 1 | Status: SHIPPED | OUTPATIENT
Start: 2024-04-03

## 2024-04-03 ASSESSMENT — FIBROSIS 4 INDEX: FIB4 SCORE: 0.41

## 2024-04-03 NOTE — PROGRESS NOTES
Subjective:     CC: Migraines, abnormal periods    HPI:   Adri presents today with the following complaints    Problem   Neck Discomfort    Patient's initial complaint was that she feels she has something in her throat.  She specifies it feels more like her thyroid.  Her mother had a history of thyroiditis.  Patient has the sensation when she is angry.    Patient also experiences muscular like left-sided neck pain that she feels is not connected to her migraines.     Metrorrhagia    Patient has 2-3 periods per month, she has had this complaint before.  This occurrence has been off and on since she had her last child 3 years ago, at which time she also had a tubal ligation.  Patient does experience pelvic cramping and lower sides of abdomen during period.  Patient has uterine cancer family history.       Migraines    Patient has had migraines for many years but lately they have increased in frequency.  It seems to have slowly increased to almost every day event.  Patient states that she is very photosensitive, she experiences a visual aura and the left side of her face goes numb during the migraine.  There is a strong migraine family history.  Patient takes 50 mg amitriptyline every night to help with sleep and migraines.  She has not been taking the rizatriptan because she is taking amitriptyline.  She has not had an MRI and has not seen neurology.         Social Hx:  One of her sons has multiple neuropsych issues.  Patient is [place]-speaking,  speaks English and [place]    Current Outpatient Medications Ordered in Epic   Medication Sig Dispense Refill    propranolol (INDERAL) 40 MG Tab Take 1 Tablet by mouth 2 times a day. [place] 1 pastilla dos veces al suzan para prevenir las migranas 60 Tablet 1    amitriptyline (ELAVIL) 50 MG Tab Take 1 Tablet by mouth every evening. 90 Tablet 3    ondansetron (ZOFRAN ODT) 4 MG TABLET DISPERSIBLE Take 1 Tablet by mouth every 6 hours as needed for Nausea/Vomiting. 10  "Tablet 0    rizatriptan (MAXALT) 5 MG tablet Take 1 Tablet by mouth 1 time a day as needed for Migraine. Please substitute with an available or covered quantity or equivalent alternative if necessary. 30 Tablet 3     No current Epic-ordered facility-administered medications on file.       Past Medical History:   Diagnosis Date    Psychiatric problem     depression        Past Surgical History:   Procedure Laterality Date    REPEAT C SECTOIN WITH SALPINGECTOMY Bilateral 3/30/2021    Procedure:  SECTION, REPEAT, WITH SALPINGECTOMY;  Surgeon: Michelle Kelly M.D.;  Location: [facility];  Service: Labor and Delivery    REPEAT C SECTION  2018    Procedure: REPEAT C SECTION;  Surgeon: Jenna Toscano M.D.;  Location: LABOR AND DELIVERY;  Service: Labor and Delivery    GYN SURGERY      C/S        No family history on file.         Objective:     Exam:  /76   Pulse 100   Temp 36.2 °C (97.1 °F) (Temporal)   Resp 20   Ht 1.511 m (4' 11.5\")   Wt 66.2 kg (146 lb)   SpO2 94%   BMI 28.99 kg/m²  Body mass index is 28.99 kg/m².    Gen: Alert and oriented, No apparent distress.  Head:  NCAT, EOMI, sclera clear without discharge  Neck: Neck is supple without lymphadenopathy.  Palpable thyroid tissue laterally without enlargement or nodules  Lungs: Normal effort, CTA bilaterally, no wheezes, rhonchi, or rales  CV: Regular rate and rhythm. No murmurs, rubs, or gallops.  Abd:   Non-distended, soft, no suprapubic or adnexal pain.  Ext: No clubbing, cyanosis, edema.  MSK: Unassisted gait  Derm: No lesions on exposed skin  Psych: normal mood and affect        Assessment & Plan:     33 y.o. female with the following -     Problem List Items Addressed This Visit       Migraines     Due to patient's frequency and concerning symptoms of migraines (left-sided facial numbness), patient would likely benefit from CGRP antagonist such as Nurtec.  - Referral to neurology, consider MRI but " no acute exacerbation in symptoms noted.  - Propranolol 40 mg twice daily started as migraine prophylaxis due to current frequency of migraines.  - Patient also encouraged to take rizatriptan on an as-needed basis but using less than 10 a month.  - Patient can continue to take 50 mg amitriptyline nightly.         Relevant Medications    propranolol (INDERAL) 40 MG Tab    amitriptyline (ELAVIL) 50 MG Tab    Other Relevant Orders    Referral to Neurology    Metrorrhagia     Uncertain why patient is menstruating so frequently but this seems to be a chronic issue..  Pain seems typical for premenopausal syndrome.  - Transvaginal US to rule out any abnormality  - Patient instructed to take NSAIDs for pain during periods, while awaiting workup with ultrasound.  - CBC due to frequent periods and last abnormal CBC.         Relevant Orders    US-PELVIC COMPLETE (TRANSABDOMINAL/TRANSVAGINAL) (COMBO)    Comp Metabolic Panel    TSH WITH REFLEX TO FT4    Neck discomfort     Physical exam revealed palpable but not enlarged thyroid tissue without palpable nodules.  - TSH for screening.  - Patient may follow-up for OMT for muscular neck discomfort.          Other Visit Diagnoses       History of anemia        Relevant Orders    CBC WITH DIFFERENTIAL            Follow-up: 5/13 for follow-up and OMT with myself.    Katharine Purcell D.O.  PGY-2

## 2024-04-04 NOTE — ASSESSMENT & PLAN NOTE
Uncertain why patient is menstruating so frequently but this seems to be a chronic issue..  Pain seems typical for premenopausal syndrome.  - Transvaginal US to rule out any abnormality  - Patient instructed to take NSAIDs for pain during periods, while awaiting workup with ultrasound.  - CBC due to frequent periods and last abnormal CBC.

## 2024-04-04 NOTE — ASSESSMENT & PLAN NOTE
Due to patient's frequency and concerning symptoms of migraines (left-sided facial numbness), patient would likely benefit from CGRP antagonist such as Nurtec.  - Referral to neurology, consider MRI but no acute exacerbation in symptoms noted.  - Propranolol 40 mg twice daily started as migraine prophylaxis due to current frequency of migraines.  - Patient also encouraged to take rizatriptan on an as-needed basis but using less than 10 a month.  - Patient can continue to take 50 mg amitriptyline nightly.

## 2024-04-04 NOTE — ASSESSMENT & PLAN NOTE
Physical exam revealed palpable but not enlarged thyroid tissue without palpable nodules.  - TSH for screening.  - Patient may follow-up for OMT for muscular neck discomfort.

## 2024-05-13 ENCOUNTER — APPOINTMENT (OUTPATIENT)
Dept: MEDICAL GROUP | Facility: CLINIC | Age: 34
End: 2024-05-13
Payer: COMMERCIAL

## 2024-06-05 NOTE — PROGRESS NOTES
[facility]  GENERAL NEUROLOGY  NEW PATIENT VISIT    Referral source: Dr. Katharine Purcell    CC: Intractable migraine without status migrainosus, unspecified migraine type     HISTORY OF ILLNESS:  Alejandra Reyes Villegas is a 33 y.o. woman, who presents with her , with a history most notable for migraine. She is Sammarinese speaking and her  is translating for her. She presented to her primary provider who started her on Amitriptyline and then propranolol for migraine prevention and rizatriptan for rescue.  She is having subpar coverage of her migraines and he is here to establish for further evaluation and treatment options.  Today, Adri provided the following history:    Migraine description:  Starts with nausea, light sensitivity, blurred. She reports 30 minutes before migraine starts. Migraine starts in the left temple and will radiate across the left scalp down neck and into left shoulder. She does report it will go down into her jaw. Quality of migraine is described as pressure and squeezing. Intensity of migraine without medication 10/10. With rizatriptan 4/10, naproxen 10/10. Migraine will last for 3 hours 24 hours. Post migraine hangover of leg pain for 1-2 hours. Frequency of migraine is everyday. Associated symptoms: light sensitivity, sound sensitivity, temperature sensitivity, nausea, vomiting, numbness/tingling left cheek, bilateral hands, left side of the body, dizziness, difficulty swallowing, double vision, subjective left sided weakness. Trigger: stress, sleep deprivation, dehydration, skipping meals, bright light, altitude changes, gasoline smell, cigarette smoke, fire smoke, alcohol, exercise. Can trigger a migraine with sudden position change, position change, crying, and coughing. Denies being able to trigger migraines with laughing, bending, or sneezing. Double vision with migraine only. Migraines occur any time of day.      The following is a summary of headache symptoms,  presented in my standard format:    Family History: none  Age at onset (years): 14 years old  Location: see above  Radiation: see above  Frequency: see above  Duration: see above  Headache Days/Month: February 15/30, , , May 30/30  Quality: see above  Intensity: see above  Aura: see above  Photophobia/Phonophobia/Nausea/Vomiting: yes, yes, yes, no  Provoked by Physical Activity?: yes  Triggers: see above  Associated Symptoms: see above  Autonomic Signs (such as ptosis, miosis, conjunctival injection, rhinorrhea, increased lacrimation): no  Head Trauma: one concussion   Association with Menses: no  ED Visits: yes  Hospitalizations: no  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  Sleep (hours/night): 11-12 hours  Caffeine Intake: 1 cup of coffee  Hydration: yes  Nutrition: skips but snacks   Exercise: yes  Analgesic Overuse: none     Current Medication Regimen:  - amitriptyline 50 mg  - propranolol 40 mg  - rizatriptan    Medications Tried: Response  Preventive:  -     Rescue:  -     Medications Not Tried:  -     MEDICAL AND SURGICAL HISTORY:  Past Medical History:   Diagnosis Date    Psychiatric problem     depression     Past Surgical History:   Procedure Laterality Date    REPEAT C SECTOIN WITH SALPINGECTOMY Bilateral 3/30/2021    Procedure:  SECTION, REPEAT, WITH SALPINGECTOMY;  Surgeon: Michelle Kelly M.D.;  Location: [facility]ERY [facility];  Service: Labor and Delivery    REPEAT C SECTION  2018    Procedure: REPEAT C SECTION;  Surgeon: Jenna Toscano M.D.;  Location: LABOR AND DELIVERY;  Service: Labor and Delivery    GYN SURGERY      C/S     MEDICATIONS:  Current Outpatient Medications   Medication Sig    propranolol (INDERAL) 40 MG Tab Take 1 Tablet by mouth 2 times a day. [place] 1 pastilla dos veces al suzan para prevenir las migranas    amitriptyline (ELAVIL) 50 MG Tab Take 1 Tablet by mouth every evening.    ondansetron (ZOFRAN  "ODT) 4 MG TABLET DISPERSIBLE Take 1 Tablet by mouth every 6 hours as needed for Nausea/Vomiting.    rizatriptan (MAXALT) 5 MG tablet Take 1 Tablet by mouth 1 time a day as needed for Migraine. Please substitute with an available or covered quantity or equivalent alternative if necessary.     SOCIAL HISTORY:  Social History     Tobacco Use    Smoking status: Never    Smokeless tobacco: Never   Substance Use Topics    Alcohol use: Not Currently     Social History     Social History Narrative    Not on file     FAMILY HISTORY:  No family history on file.    Ambulatory Vitals  Encounter Vitals  Temperature: 35.9 °C (96.6 °F)  Temp src: Temporal  Blood Pressure: 110/60  Pulse: 93  Pulse Oximetry: 97 %  Weight: 67 kg (147 lb 11.3 oz)  Height: 151.1 cm (4' 11.49\")  BMI (Calculated): 29.35     REVIEW OF SYSTEMS:  A ROS was completed.  Pertinent positives and negatives were included in the HPI, above.  All other systems were reviewed and are negative.    PHYSICAL EXAM:  General/Medical:  Adri presents clean and well-dressed.  She does appear in some discomfort at this time.  She does endorse starting a migraine from the lights in the office.  She has no malar rash.  She has some stiffness of her neck.  She reports no jaw claudication or jaw pain.  She reports no allodynia.  She has no upper or lower extremity edema.  She has palpable radial pulses.  She has good capillary refill in the upper extremities.  She has no upper or lower extremity edema.  Auscultation of her heart revealed S1 and S2 at a rapid rate with no other abnormal sounds.  Vital signs are listed above and are within normal limits.    Neuro:  MENTAL STATUS: awake and alert; no deficits of speech or language; oriented to person, place, and time; affect was appropriate to situation    CRANIAL NERVES:    II: acuity: Deferred patient having an acute migraine    III/IV/VI: versions: intact without nystagmus    V: facial sensation: felt less in the bilateral " cheeks and chin    VII: facial expression: symmetric    VIII: hearing: intact to finger rub    IX/X: palate: elevates symmetrically    XI: shoulder shrug: symmetric    XII: tongue: midline    MOTOR:  - bulk: normal throughout  - tone: normal throughout  Upper Extremity Strength  (R/L)    4+/5   Elbow flexion 4+/5   Elbow extension 4+/5   Shoulder abduction 4+/5     Lower Extremity Strength  (R/L)   Hip flexion 4+/5   Knee extension 5/5   Knee flexion 5/5   Ankle plantarflexion 5/5   Ankle dorsiflexion 5/5     - pronator drift: Slight right arm drift  - abnormal movements: none    SENSATION:  - light touch: Intact  - vibration: Intact  - temperature: Felt more on the left side less on the right side consistently  - pinprick: NT  - proprioception: NT  - Romberg: absent    COORDINATION:  - finger to nose: normal, no ataxia on exam  - finger tapping: rapid and accurate, bilaterally  - foot tapping: Rapid and accurate, bilaterally    REFLEXES:  Reflex Right Left   BR 1+ 1+   Biceps 1+ 1+   Triceps 1+ 1+   Patellae 1+ 1+   Achilles 1+ 1+   Toes NT NT   Not fully relaxed for exam  GAIT:  - narrow base and normal, with normal arm swing  - heel-walk: Intact   - toe-walk: intact  - tandem gait: intact    REVIEW OF IMAGING STUDIES: I reviewed the following studies:  MRI Brain:  N/A    REVIEW OF LABORATORY STUDIES:  No current pertinent labs    ASSESSMENT& PLAN:  1. Migraine with aura and without status migrainosus, not intractable   Adri and her  present to establish with a neurology provider for continued management of her migraines.  She presented to her primary provider who was initially started her on amitriptyline with limited success with management of her migraines and then recently added propranolol.  She was also started on rizatriptan which she feels is helpful but does not fully get rid of her migraine.  However, she she was not redosing with the rizatriptan.  Her  reports that she will be  leaving the country to go to [place] for a month.  She is concerned about increased frequency and intensity of her migraines and needs better treatment options.  Her description of her migraines do sound brainstem in aura.  In the future she could possibly benefit from: Verapamil, nimodipine, acetazolamide, or valproic acid.  Her current frequency of migraines occurs approximately 15 to 20 days a month of which she is incapacitated for greater than 4 hours on greater than 4 days.  She does meet requirements for continued preventative migraine medication.  Her neurological exam revealed some diminished sensation on the bilateral lower face, diminished muscle strength in the right upper and lower extremity, left-sided temperature differentiation, and diminished reflexes.  She has not yet had a brain MRI I will defer for now but may revisit at next appointment.  Currently she is taking amitriptyline which she is happy with but does not seem effective for her migraines.  Her  states that she is unhappy with the propranolol she could stop this and we could start a different medication.  Since she has tried and failed to preventative medications we talked about moving to a different class of medications including: Qulipta, Emgality, Aimovig, Ajovy, Botox, or Nurtec as a preventative.  Adri would like to review the medications before deciding on a new preventative medication.  For rizatriptan she was on a 5 mg dose we increase this to 10 mg and discussed redosing and side effects.  She does endorse having nausea associated with her migraine but has Zofran at home.  We discussed lifestyle changes such as adequate sleep, staying hydrated, not skipping meals, stress reduction, and not overusing over-the-counter analgesics.  Since Adri is having a migraine in office I will give her a Toradol injection and send her home with Nurtec to break up her status that she is having now.  Adri and her  will  review her next preventative medication and reach out via [employer] with the decision of which medication she would like to proceed with.  If she decides on Botox I would like to see her a month after her first Botox round to discuss her migraines with Botox.  If she decides on any other medication I would like to see her back 2 months after starting that medication to discuss the medications effect on her migraines.  Adri or her  can contact me via [employer] with any updates, concerns, or questions.  Otherwise I will see her back in 1 to 2 months.    - rizatriptan (MAXALT-MLT) 10 MG disintegrating tablet; Take 10 mg at the onset of aura/HA; may re-dose x1 after 2 hrs if HA persists; MDD: 20 mg; do not use >2 days/week.  Dispense: 10 Tablet; Refill: 6  - amitriptyline (ELAVIL) 50 MG Tab; Take 1 Tablet by mouth every evening.  Dispense: 30 Tablet; Refill: 11  - Rimegepant Sulfate (NURTEC) 75 MG TABLET DISPERSIBLE; Take 1 Tablet by mouth as needed (migraine). Take at headache onset. Repeat once in 24 hours  Dispense: 4 Tablet; Refill: 0  - ketorolac (Toradol) injection 60 mg     BILLING DOCUMENTATION:   I spent 90 minutes in patient care. This includes time with chart review, pre-charting, records review, discussions with other healthcare providers, and documentation. This also includes face-to-face time with the patient for: exam review, discussion and treatment planning.     Judy Dias MSN APRN-CNP  [facility] [place]

## 2024-06-07 ENCOUNTER — OFFICE VISIT (OUTPATIENT)
Dept: NEUROLOGY | Facility: MEDICAL CENTER | Age: 34
End: 2024-06-07
Payer: COMMERCIAL

## 2024-06-07 ENCOUNTER — PHARMACY VISIT (OUTPATIENT)
Dept: PHARMACY | Facility: MEDICAL CENTER | Age: 34
End: 2024-06-07
Payer: COMMERCIAL

## 2024-06-07 ENCOUNTER — TELEPHONE (OUTPATIENT)
Dept: NEUROLOGY | Facility: MEDICAL CENTER | Age: 34
End: 2024-06-07
Payer: COMMERCIAL

## 2024-06-07 VITALS
HEIGHT: 59 IN | TEMPERATURE: 96.6 F | HEART RATE: 93 BPM | OXYGEN SATURATION: 97 % | BODY MASS INDEX: 29.78 KG/M2 | WEIGHT: 147.71 LBS | DIASTOLIC BLOOD PRESSURE: 60 MMHG | SYSTOLIC BLOOD PRESSURE: 110 MMHG

## 2024-06-07 DIAGNOSIS — G43.109 MIGRAINE WITH AURA AND WITHOUT STATUS MIGRAINOSUS, NOT INTRACTABLE: ICD-10-CM

## 2024-06-07 PROCEDURE — 99417 PROLNG OP E/M EACH 15 MIN: CPT

## 2024-06-07 PROCEDURE — 3078F DIAST BP <80 MM HG: CPT

## 2024-06-07 PROCEDURE — 3074F SYST BP LT 130 MM HG: CPT

## 2024-06-07 PROCEDURE — RXMED WILLOW AMBULATORY MEDICATION CHARGE

## 2024-06-07 PROCEDURE — 99211 OFF/OP EST MAY X REQ PHY/QHP: CPT

## 2024-06-07 PROCEDURE — 700111 HCHG RX REV CODE 636 W/ 250 OVERRIDE (IP): Mod: JZ

## 2024-06-07 PROCEDURE — 99205 OFFICE O/P NEW HI 60 MIN: CPT

## 2024-06-07 RX ORDER — KETOROLAC TROMETHAMINE 30 MG/ML
60 INJECTION, SOLUTION INTRAMUSCULAR; INTRAVENOUS ONCE
Status: COMPLETED | OUTPATIENT
Start: 2024-06-07 | End: 2024-06-07

## 2024-06-07 RX ORDER — AMITRIPTYLINE HYDROCHLORIDE 50 MG/1
50 TABLET, FILM COATED ORAL NIGHTLY
Qty: 30 TABLET | Refills: 11 | Status: SHIPPED | OUTPATIENT
Start: 2024-06-07

## 2024-06-07 RX ORDER — RIMEGEPANT SULFATE 75 MG/75MG
1 TABLET, ORALLY DISINTEGRATING ORAL PRN
Qty: 4 TABLET | Refills: 0 | Status: SHIPPED | OUTPATIENT
Start: 2024-06-07

## 2024-06-07 RX ORDER — RIZATRIPTAN BENZOATE 10 MG/1
TABLET, ORALLY DISINTEGRATING ORAL
Qty: 10 TABLET | Refills: 6 | Status: SHIPPED | OUTPATIENT
Start: 2024-06-07

## 2024-06-07 RX ADMIN — KETOROLAC TROMETHAMINE 60 MG: 30 INJECTION, SOLUTION INTRAMUSCULAR at 16:20

## 2024-06-07 ASSESSMENT — PATIENT HEALTH QUESTIONNAIRE - PHQ9: CLINICAL INTERPRETATION OF PHQ2 SCORE: 0

## 2024-06-07 ASSESSMENT — FIBROSIS 4 INDEX: FIB4 SCORE: 0.41

## 2024-06-07 NOTE — TELEPHONE ENCOUNTER
Received [employer] [facility] request via [employer]  for rizatriptan (MAXALT-MLT) 10 MG disintegrating tablet . (Quantity:10, Day Supply:30)     Insurance: [employer]  Member ID:  [id number]  BIN: [id number]  PCN: [id number]  Group: [id number]     Ran Test claim via [place] & medication Pays for a $4.00 copay. Will outreach to patient to offer specialty pharmacy services and or release to preferred pharmacy

## 2024-06-07 NOTE — PATIENT INSTRUCTIONS
Try supplementing:  - magnesium: 400 mg  - riboflavin (vitamin B2): 400 mg once daily  - coenzyme Q10: 300 mg daily     At onset of migraine take one rizatriptan, one zofran, and two aleve. If in 2 hours if migraine is not 0/10 take another rizatriptan    Consider:  Qulipta  Aimovig  Ajovalejandrina  Emgailty  Botox  Nurtec

## 2024-09-09 ENCOUNTER — OFFICE VISIT (OUTPATIENT)
Dept: URGENT CARE | Facility: CLINIC | Age: 34
End: 2024-09-09
Payer: COMMERCIAL

## 2024-09-09 VITALS
HEART RATE: 100 BPM | DIASTOLIC BLOOD PRESSURE: 80 MMHG | RESPIRATION RATE: 20 BRPM | OXYGEN SATURATION: 99 % | SYSTOLIC BLOOD PRESSURE: 138 MMHG | TEMPERATURE: 97 F | BODY MASS INDEX: 29.43 KG/M2 | WEIGHT: 146 LBS | HEIGHT: 59 IN

## 2024-09-09 DIAGNOSIS — K04.7 DENTAL INFECTION: ICD-10-CM

## 2024-09-09 PROCEDURE — 3075F SYST BP GE 130 - 139MM HG: CPT | Performed by: PHYSICIAN ASSISTANT

## 2024-09-09 PROCEDURE — 3079F DIAST BP 80-89 MM HG: CPT | Performed by: PHYSICIAN ASSISTANT

## 2024-09-09 PROCEDURE — 99213 OFFICE O/P EST LOW 20 MIN: CPT | Performed by: PHYSICIAN ASSISTANT

## 2024-09-09 RX ORDER — AMOXICILLIN 875 MG
875 TABLET ORAL 2 TIMES DAILY
Qty: 14 TABLET | Refills: 0 | Status: SHIPPED | OUTPATIENT
Start: 2024-09-09 | End: 2024-09-16

## 2024-09-09 ASSESSMENT — ENCOUNTER SYMPTOMS
FEVER: 0
SINUS PRESSURE: 0

## 2024-09-09 ASSESSMENT — FIBROSIS 4 INDEX: FIB4 SCORE: 0.42

## 2024-09-09 NOTE — PROGRESS NOTES
Subjective:   Alejandra Reyes Villegas is a 34 y.o. female who presents today with   Chief Complaint   Patient presents with   • Dental Pain     Patient is here today for dental pain. Patient states she has a broken tooth and has an infection     Dental Pain   This is a new problem. The current episode started in the past 7 days. The problem occurs constantly. The problem has been gradually worsening. The pain is moderate. Associated symptoms include facial pain. Pertinent negatives include no difficulty swallowing, fever, oral bleeding, sinus pressure or thermal sensitivity. She has tried NSAIDs (tramadol) for the symptoms. The treatment provided mild relief.     PMH:  has a past medical history of Psychiatric problem.  MEDS:   Current Outpatient Medications:   •  amoxicillin (AMOXIL) 875 MG tablet, Take 1 Tablet by mouth 2 times a day for 7 days., Disp: 14 Tablet, Rfl: 0  •  rizatriptan (MAXALT-MLT) 10 MG disintegrating tablet, Take 10 mg at the onset of aura/HA; may re-dose x1 after 2 hrs if HA persists; MDD: 20 mg; do not use >2 days/week., Disp: 10 Tablet, Rfl: 6  •  amitriptyline (ELAVIL) 50 MG Tab, Take 1 Tablet by mouth every evening., Disp: 30 Tablet, Rfl: 11  •  Rimegepant Sulfate (NURTEC) 75 MG TABLET DISPERSIBLE, Take 1 Tablet by mouth as needed (migraine). Take at headache onset. Repeat once in 24 hours, Disp: 4 Tablet, Rfl: 0  •  propranolol (INDERAL) 40 MG Tab, Take 1 Tablet by mouth 2 times a day. [place] 1 pastilla dos veces al suzan para prevenir las migranas, Disp: 60 Tablet, Rfl: 1  •  ondansetron (ZOFRAN ODT) 4 MG TABLET DISPERSIBLE, Take 1 Tablet by mouth every 6 hours as needed for Nausea/Vomiting. (Patient not taking: Reported on 2024), Disp: 10 Tablet, Rfl: 0  ALLERGIES: No Known Allergies  SURGHX:   Past Surgical History:   Procedure Laterality Date   • REPEAT C SECTOIN WITH SALPINGECTOMY Bilateral 3/30/2021    Procedure:  SECTION, REPEAT, WITH SALPINGECTOMY;  Surgeon: Michelle KING  "RAIZA Kelly;  Location: SURGERY LAB[facility] DELIVERY;  Service: Labor and Delivery   • REPEAT C SECTION  12/2/2018    Procedure: REPEAT C SECTION;  Surgeon: Jenna Toscano M.D.;  Location: LABOR AND DELIVERY;  Service: Labor and Delivery   • GYN SURGERY      C/S     SOCHX:  reports that she has never smoked. She has never used smokeless tobacco. She reports that she does not currently use alcohol. She reports that she does not use drugs.  FH: Reviewed with patient, not pertinent to this visit.     Review of Systems   Constitutional:  Negative for fever.   HENT:  Negative for sinus pressure.         Dental infection      Objective:   /80   Pulse 100   Temp 36.1 °C (97 °F) (Temporal)   Resp 20   Ht 1.5 m (4' 11.06\")   Wt 66.2 kg (146 lb)   SpO2 99%   BMI 29.43 kg/m²   Physical Exam  Vitals and nursing note reviewed.   Constitutional:       General: She is not in acute distress.     Appearance: Normal appearance. She is well-developed. She is not ill-appearing or toxic-appearing.   HENT:      Head: Normocephalic and atraumatic.        Right Ear: Hearing normal.      Left Ear: Hearing normal.      Mouth/Throat:      Dentition: Abnormal dentition. Dental tenderness, gingival swelling and dental caries present. No dental abscesses.      Pharynx: Uvula midline. No posterior oropharyngeal erythema or uvula swelling.      Tonsils: No tonsillar exudate or tonsillar abscesses.        Comments: Second to last tooth on the right posterior lower aspect is broken with surrounding erythema.  Neck:      Comments: No notable submandibular swelling or fullness noted.  Cardiovascular:      Rate and Rhythm: Normal rate.   Pulmonary:      Effort: Pulmonary effort is normal.   Musculoskeletal:      Comments: Normal movement in all 4 extremities   Skin:     General: Skin is warm and dry.   Neurological:      Mental Status: She is alert.      Coordination: Coordination normal.   Psychiatric:         Mood and " Affect: Mood normal.     Assessment/Plan:   Assessment    1. Dental infection  - amoxicillin (AMOXIL) 875 MG tablet; Take 1 Tablet by mouth 2 times a day for 7 days.  Dispense: 14 Tablet; Refill: 0    Symptoms and presentation appear consistent with dental infection which I recommend starting on antibiotics at this time.  She has plans to follow-up with her dentist in [place].  Patient states no chance of pregnancy and not currently breast-feeding.    Differential diagnosis, natural history, supportive care, and indications for immediate follow-up discussed.   Patient given instructions and understanding of medications and treatment.    If not improving in 3-5 days, F/U with PCP or return to  if symptoms worsen.    Patient agreeable to plan.    Please note that this dictation was created using voice recognition software. I have made every reasonable attempt to correct obvious errors, but I expect that there are errors of grammar and possibly content that I did not discover before finalizing the note.    Altaf Champion PA-C

## 2025-05-14 DIAGNOSIS — G43.109 MIGRAINE WITH AURA AND WITHOUT STATUS MIGRAINOSUS, NOT INTRACTABLE: ICD-10-CM

## 2025-05-14 RX ORDER — RIZATRIPTAN BENZOATE 10 MG/1
TABLET, ORALLY DISINTEGRATING ORAL
Qty: 10 TABLET | Refills: 6 | Status: SHIPPED | OUTPATIENT
Start: 2025-05-14

## 2025-06-28 DIAGNOSIS — G43.109 MIGRAINE WITH AURA AND WITHOUT STATUS MIGRAINOSUS, NOT INTRACTABLE: ICD-10-CM

## 2025-06-30 ENCOUNTER — OFFICE VISIT (OUTPATIENT)
Dept: MEDICAL GROUP | Facility: CLINIC | Age: 35
End: 2025-06-30
Payer: COMMERCIAL

## 2025-06-30 VITALS
DIASTOLIC BLOOD PRESSURE: 88 MMHG | HEART RATE: 95 BPM | BODY MASS INDEX: 29.05 KG/M2 | SYSTOLIC BLOOD PRESSURE: 121 MMHG | WEIGHT: 144.1 LBS | TEMPERATURE: 97.9 F | HEIGHT: 59 IN | OXYGEN SATURATION: 96 %

## 2025-06-30 DIAGNOSIS — G43.109 MIGRAINE WITH AURA AND WITHOUT STATUS MIGRAINOSUS, NOT INTRACTABLE: ICD-10-CM

## 2025-06-30 DIAGNOSIS — N64.4 BREAST PAIN, LEFT: Primary | ICD-10-CM

## 2025-06-30 ASSESSMENT — FIBROSIS 4 INDEX: FIB4 SCORE: 0.42

## 2025-06-30 NOTE — TELEPHONE ENCOUNTER
Received request via: Pharmacy    Was the patient seen in the last year in this department? No    Does the patient have an active prescription (recently filled or refills available) for medication(s) requested? No    Pharmacy Name: [facility]/pharmacy #[id number] - [place] - [address] in [place]     Does the patient have FPC Plus and need 100-day supply? (This applies to ALL medications) Patient does not have SCP

## 2025-06-30 NOTE — TELEPHONE ENCOUNTER
Received request via: Patient    Was the patient seen in the last year in this department? No    Does the patient have an active prescription (recently filled or refills available) for medication(s) requested? No    Pharmacy Name: [facility]AC[facility] #[id number] - [place] - [address] AT [facility] [[id number]]     Does the patient have jail Plus and need 100-day supply? (This applies to ALL medications) Patient does not have SCP

## 2025-06-30 NOTE — ASSESSMENT & PLAN NOTE
Patient is having pain in the left breast that has been ongoing for 3 months.  She does have a strong family history of breast cancer.  Breast exam performed in clinic, possible nodule of the left breast in the 4 o'clock position about 2 cm away from the nipple.    Plan:  -Will order breast ultrasound given age and family history of breast cancer

## 2025-06-30 NOTE — PROGRESS NOTES
"Subjective:     CC: Breast pain    Interpretation patient's     HPI:   Adri presents today with pain in left breast that extends into armpit.  The pain started about 3 months ago.  First patient thought she had pulled a muscle as she had started going to the gym.  Lower, now she has not been going to the gym for about a month and continues to have the pain.  She says the pain is off and on.  It is worse right around her.  As well as if she is wearing a bra for too long.  She denies any rashes or fevers.  She said she thought she had some discharge about a month ago from the nipple but has had nothing since.  She does have a strong family history of breast cancer including her mom, grandma and multiple aunts.     Social Hx:    Problem   Breast Pain, Left       Current Medications and Prescriptions Ordered in Epic[1]    Past Medical History[2]     Past Surgical History[3]     No family history on file.       ROS:  Gen: no fevers/chills, no weight loss  Pulm: no sob, no cough  CV: no chest pain, no palpitations  GI: no nausea/vomiting, no diarrhea  : no dysuria  MSk: no myalgias  Skin: no rash  Neuro: no headaches, no weakness      Objective:     Exam:  /88   Pulse 95   Temp 36.6 °C (97.9 °F) (Temporal)   Ht 1.499 m (4' 11\")   Wt 65.4 kg (144 lb 1.6 oz)   SpO2 96%   Breastfeeding No   BMI 29.10 kg/m²  Body mass index is 29.1 kg/m².    Gen: Alert and oriented, No apparent distress.  Head:  NCAT, EOMI, sclera clear without discharge  Neck: Neck is supple without lymphadenopathy.  Lungs: Normal effort, CTA bilaterally, no wheezes, rhonchi, or rales  CV: Regular rate and rhythm. No murmurs, rubs, or gallops.  Abd:   Non-distended, soft  Breast: No abnormalities noted on right side, no dimpling or changes in the skin, possible 2 to 3 mm nodule under the left breast in the 4 o'clock position.  Left breast is tender to palpation  Ext: No clubbing, cyanosis, edema.  MSK: Unassisted gait  Derm: No " lesions on exposed skin  Psych: normal mood and affect        Assessment & Plan:     34 y.o. female with the following -     Problem List Items Addressed This Visit       Breast pain, left - Primary    Patient is having pain in the left breast that has been ongoing for 3 months.  She does have a strong family history of breast cancer.  Breast exam performed in clinic, possible nodule of the left breast in the 4 o'clock position about 2 cm away from the nipple.    Plan:  -Will order breast ultrasound given age and family history of breast cancer         Relevant Orders    US-BREAST LIMITED-LEFT         Follow-up: As needed             [1]   Current Outpatient Medications Ordered in Epic   Medication Sig Dispense Refill    rizatriptan (MAXALT-MLT) 10 MG disintegrating tablet Take 10 mg at the onset of aura/HA; may re-dose x1 after 2 hrs if HA persists; MDD: 20 mg; do not use >2 days/week. 10 Tablet 6    amitriptyline (ELAVIL) 50 MG Tab Take 1 Tablet by mouth every evening. 30 Tablet 11    propranolol (INDERAL) 40 MG Tab Take 1 Tablet by mouth 2 times a day. [place] 1 pastilla dos veces al suzan para prevenir las migranas 60 Tablet 1    ondansetron (ZOFRAN ODT) 4 MG TABLET DISPERSIBLE Take 1 Tablet by mouth every 6 hours as needed for Nausea/Vomiting. (Patient not taking: Reported on 2025) 10 Tablet 0     No current Epic-ordered facility-administered medications on file.   [2]   Past Medical History:  Diagnosis Date    Psychiatric problem     depression   [3]   Past Surgical History:  Procedure Laterality Date    REPEAT C SECTOIN WITH SALPINGECTOMY Bilateral 3/30/2021    Procedure:  SECTION, REPEAT, WITH SALPINGECTOMY;  Surgeon: Michelle Kelly M.D.;  Location: [facility];  Service: Labor and Delivery    REPEAT C SECTION  2018    Procedure: REPEAT C SECTION;  Surgeon: Jenna Toscano M.D.;  Location: LABOR AND DELIVERY;  Service: Labor and Delivery    GYN SURGERY      C/S

## 2025-07-02 DIAGNOSIS — N63.20 MASS OF LEFT BREAST, UNSPECIFIED QUADRANT: ICD-10-CM

## 2025-07-02 DIAGNOSIS — N64.4 BREAST PAIN, LEFT: Primary | ICD-10-CM

## 2025-07-02 RX ORDER — AMITRIPTYLINE HYDROCHLORIDE 50 MG/1
50 TABLET ORAL EVERY EVENING
Qty: 90 TABLET | Refills: 3 | Status: SHIPPED | OUTPATIENT
Start: 2025-07-02

## 2025-07-02 RX ORDER — AMITRIPTYLINE HYDROCHLORIDE 50 MG/1
50 TABLET ORAL NIGHTLY
Qty: 30 TABLET | Refills: 11 | OUTPATIENT
Start: 2025-07-02

## 2025-07-07 DIAGNOSIS — G43.109 MIGRAINE WITH AURA AND WITHOUT STATUS MIGRAINOSUS, NOT INTRACTABLE: ICD-10-CM

## 2025-07-07 RX ORDER — RIZATRIPTAN BENZOATE 10 MG/1
TABLET, ORALLY DISINTEGRATING ORAL
Qty: 10 TABLET | Refills: 6 | OUTPATIENT
Start: 2025-07-07

## 2025-08-22 ENCOUNTER — HOSPITAL ENCOUNTER (OUTPATIENT)
Facility: MEDICAL CENTER | Age: 35
End: 2025-08-22
Payer: COMMERCIAL

## 2025-08-22 VITALS — BODY MASS INDEX: 27.88 KG/M2 | WEIGHT: 142 LBS | HEIGHT: 60 IN

## 2025-08-22 DIAGNOSIS — N64.4 BREAST PAIN, LEFT: ICD-10-CM

## 2025-08-22 DIAGNOSIS — N63.20 MASS OF LEFT BREAST, UNSPECIFIED QUADRANT: ICD-10-CM

## 2025-08-22 PROCEDURE — 76642 ULTRASOUND BREAST LIMITED: CPT | Mod: LT

## 2025-08-22 PROCEDURE — G0279 TOMOSYNTHESIS, MAMMO: HCPCS

## (undated) DEVICE — SUTURE 0 VICRYL PLUS CT-1 - 36 INCH (36/BX)

## (undated) DEVICE — PACK C-SECTION (2EA/CA)

## (undated) DEVICE — TUBING CLEARLINK DUO-VENT - C-FLO (48EA/CA)

## (undated) DEVICE — TRAY SPINAL ANESTHESIA NON-SAFETY (10/CA)

## (undated) DEVICE — DETERGENT RENUZYME PLUS 10 OZ PACKET (50/BX)

## (undated) DEVICE — DRESSING POST OP BORDER 4 X 10 (5EA/BX)

## (undated) DEVICE — PAD LAP STERILE 18 X 18 - (5/PK 40PK/CA)

## (undated) DEVICE — WATER IRRIGATION STERILE 1000ML (12EA/CA)

## (undated) DEVICE — SUTURE 1 CHROMIC GUTCT-1 27 (36PK/BX)"

## (undated) DEVICE — GLOVE BIOGEL SZ 6.5 SURGICAL PF LTX (50PR/BX 4BX/CA)

## (undated) DEVICE — HEAD HOLDER JUNIOR/ADULT

## (undated) DEVICE — KIT  I.V. START (100EA/CA)

## (undated) DEVICE — SET EXTENSION WITH 2 PORTS (48EA/CA) ***PART #2C8610 IS A SUBSTITUTE*****

## (undated) DEVICE — SODIUM CHL IRRIGATION 0.9% 1000ML (12EA/CA)

## (undated) DEVICE — SUTURE 3-0 VICRYL PLUS CT-1 - 36 INCH (36/BX)

## (undated) DEVICE — ELECTRODE DUAL RETURN W/ CORD - (50/PK)

## (undated) DEVICE — SUTURE 1 CHROMIC CTX ETHICON - (36PK/BX)

## (undated) DEVICE — CATHETER IV NON-SAFETY 18 GA X 1 1/4 (50/BX 4BX/CA)

## (undated) DEVICE — SUTURE 0 GUT-PLAIN (36PK/BX)

## (undated) DEVICE — WATER IRRIG. STER. 1500 ML - (9/CA)

## (undated) DEVICE — STAPLER SKIN DISP - (6/BX 10BX/CA) VISISTAT